# Patient Record
Sex: FEMALE | Race: WHITE | NOT HISPANIC OR LATINO | Employment: OTHER | URBAN - METROPOLITAN AREA
[De-identification: names, ages, dates, MRNs, and addresses within clinical notes are randomized per-mention and may not be internally consistent; named-entity substitution may affect disease eponyms.]

---

## 2017-01-26 ENCOUNTER — TRANSCRIBE ORDERS (OUTPATIENT)
Dept: ADMINISTRATIVE | Facility: HOSPITAL | Age: 82
End: 2017-01-26

## 2017-01-26 DIAGNOSIS — H93.91 UNSPECIFIED DISORDER OF RIGHT EAR: Primary | ICD-10-CM

## 2017-01-30 ENCOUNTER — HOSPITAL ENCOUNTER (OUTPATIENT)
Dept: CT IMAGING | Facility: HOSPITAL | Age: 82
Discharge: HOME | End: 2017-01-30
Attending: OTOLARYNGOLOGY | Admitting: OTOLARYNGOLOGY

## 2017-01-30 DIAGNOSIS — H93.91 UNSPECIFIED DISORDER OF RIGHT EAR: ICD-10-CM

## 2017-01-30 PROCEDURE — 70482 CT ORBIT/EAR/FOSSA W/O&W/DYE: CPT

## 2017-01-30 PROCEDURE — 0 IOPAMIDOL 61 % SOLUTION: Performed by: OTOLARYNGOLOGY

## 2017-01-30 PROCEDURE — 82565 ASSAY OF CREATININE: CPT

## 2017-01-30 RX ADMIN — IOPAMIDOL 60 ML: 612 INJECTION, SOLUTION INTRAVENOUS at 15:05

## 2017-02-02 LAB — CREAT BLDA-MCNC: 1 MG/DL (ref 0.6–1.3)

## 2017-04-19 ENCOUNTER — OFFICE VISIT (OUTPATIENT)
Dept: RADIATION ONCOLOGY | Facility: HOSPITAL | Age: 82
End: 2017-04-19

## 2017-04-19 ENCOUNTER — HOSPITAL ENCOUNTER (OUTPATIENT)
Dept: RADIATION ONCOLOGY | Facility: HOSPITAL | Age: 82
Setting detail: RADIATION/ONCOLOGY SERIES
Discharge: HOME | End: 2017-04-19

## 2017-04-19 VITALS
HEART RATE: 90 BPM | TEMPERATURE: 98.4 F | DIASTOLIC BLOOD PRESSURE: 80 MMHG | BODY MASS INDEX: 26.58 KG/M2 | OXYGEN SATURATION: 97 % | WEIGHT: 165.9 LBS | SYSTOLIC BLOOD PRESSURE: 158 MMHG

## 2017-04-19 DIAGNOSIS — C50.412 MALIGNANT NEOPLASM OF UPPER-OUTER QUADRANT OF LEFT FEMALE BREAST (HCC): Primary | ICD-10-CM

## 2017-04-19 PROCEDURE — G0463 HOSPITAL OUTPT CLINIC VISIT: HCPCS

## 2017-04-19 RX ORDER — DOCUSATE SODIUM 250 MG
250 CAPSULE ORAL DAILY PRN
COMMUNITY

## 2017-04-19 RX ORDER — NAPROXEN SODIUM 220 MG
220 TABLET ORAL 2 TIMES DAILY PRN
COMMUNITY

## 2017-04-19 RX ORDER — CHOLECALCIFEROL (VITAMIN D3) 25 MCG
1000 CAPSULE ORAL DAILY
COMMUNITY

## 2017-04-19 NOTE — PROGRESS NOTES
FOLLOW UP NOTE    PATIENT:                                                      Alessio Kwon  MEDICAL RECORD #:                        [id number]  :                                                          1932  COMPLETION DATE:    16  DIAGNOSIS:     Malignant neoplasm of upper-outer quadrant of left female breast    Staging form: Breast, AJCC V7      Clinical stage from 2016: Stage IIA (T1c, N1, M0)     BRIEF HISTORY:    Routine follow-up visit.  She has a history of left breast cancer, estrogen positive, metastatic to a single lymph node.  She completed adjuvant radiotherapy.  She continues a five-year course of hormonal therapy with Arimidex.  She appears to be tolerating that quite well.  Her chronic arthritis has slightly improved but remains her most significant problem.  She has not yet had a post treatment mammogram.    MEDICATIONS: Medication reconciliation for the patient was reviewed and confirmed in the electronic medical record.    Review of Systems   Constitutional: Negative.    HENT:   Positive for tinnitus.    Eyes: Negative.    Respiratory: Negative.    Cardiovascular: Negative.    Gastrointestinal: Positive for constipation.   Endocrine: Negative.    Genitourinary: Positive for bladder incontinence and frequency.    Musculoskeletal: Positive for arthralgias.   Skin: Negative.    Neurological: Negative.    Hematological: Bruises/bleeds easily.   Psychiatric/Behavioral: Negative.                   KPS 80%    Physical Exam   Constitutional: She is oriented to person, place, and time. She appears well-developed and well-nourished.   HENT:   Head: Normocephalic and atraumatic.   Neck: Normal range of motion. Neck supple.   Cardiovascular: Normal rate, regular rhythm and normal heart sounds.    No murmur heard.  Pulmonary/Chest: Effort normal and breath sounds normal. She has no wheezes. She has no rales. Left breast exhibits mass (5 cm area of induration corresponding to the  lumpectomy bed.  The remaining tissue is soft and there are no suspicious masses.) and skin change (no significant dermatitis.  Healed lumpectomy scar is in the lateral left breast.). Left breast exhibits no nipple discharge and no tenderness.   Abdominal: Soft. Bowel sounds are normal. She exhibits no distension. There is no hepatosplenomegaly. There is no tenderness.   Musculoskeletal: Normal range of motion. She exhibits no edema or tenderness.   Lymphadenopathy:     She has no cervical adenopathy.     She has no axillary adenopathy.        Right: No supraclavicular adenopathy present.        Left: No supraclavicular adenopathy present.   Neurological: She is alert and oriented to person, place, and time. She has normal strength. No sensory deficit.   Skin: Skin is warm and dry.   Psychiatric: She has a normal mood and affect. Her behavior is normal. Judgment and thought content normal.   Nursing note and vitals reviewed.      VITAL SIGNS:   Vitals:    04/19/17 1128   BP: 158/80   Pulse: 90   Temp: 98.4 °F (36.9 °C)   TempSrc: Temporal Artery    SpO2: 97%   Weight: 165 lb 14.4 oz (75.3 kg)   PainSc: 0-No pain       The following portions of the patient's history were reviewed and updated as appropriate: allergies, current medications, past family history, past medical history, past social history, past surgical history and problem list.     IMPRESSION:  6 months status post adjuvant radiotherapy for left breast cancer following conservation surgery.  No evidence of disease recurrence.    RECOMMENDATIONS:  She is due for initial post surgery mammography by mid June 2017.  She wishes scheduled this through her primary physician.    Continue Arimidex ×5 years.  This was been prescribed by Dr. Lebron.    Patient prefers follow-up as needed since she is doing so well.  I'll be happy to see her any time questions arise or if she develops symptoms.    Return if symptoms worsen or fail to improve.    Augustine Spring,  MD    Errors in dictation may reflect use of voice recognition software and not all errors in transcription may have been detected prior to signing.

## 2017-04-26 ENCOUNTER — TRANSCRIBE ORDERS (OUTPATIENT)
Dept: ADMINISTRATIVE | Facility: HOSPITAL | Age: 82
End: 2017-04-26

## 2017-04-26 DIAGNOSIS — R92.8 ABNORMAL MAMMOGRAM: Primary | ICD-10-CM

## 2017-05-05 ENCOUNTER — HOSPITAL ENCOUNTER (OUTPATIENT)
Dept: MAMMOGRAPHY | Facility: HOSPITAL | Age: 82
Discharge: HOME | End: 2017-05-05
Attending: FAMILY MEDICINE | Admitting: FAMILY MEDICINE

## 2017-05-05 DIAGNOSIS — R92.8 ABNORMAL MAMMOGRAM: ICD-10-CM

## 2017-05-05 PROCEDURE — G0279 TOMOSYNTHESIS, MAMMO: HCPCS | Performed by: RADIOLOGY

## 2017-05-05 PROCEDURE — G0204 DX MAMMO INCL CAD BI: HCPCS | Performed by: RADIOLOGY

## 2017-05-05 PROCEDURE — G0206 DX MAMMO INCL CAD UNI: HCPCS

## 2017-05-05 PROCEDURE — G0279 TOMOSYNTHESIS, MAMMO: HCPCS

## 2017-05-05 PROCEDURE — G0204 DX MAMMO INCL CAD BI: HCPCS

## 2017-09-22 ENCOUNTER — TRANSCRIBE ORDERS (OUTPATIENT)
Dept: ADMINISTRATIVE | Facility: HOSPITAL | Age: 82
End: 2017-09-22

## 2017-09-22 DIAGNOSIS — R92.8 ABNORMAL MAMMOGRAM: Primary | ICD-10-CM

## 2017-09-25 ENCOUNTER — TRANSCRIBE ORDERS (OUTPATIENT)
Dept: ADMINISTRATIVE | Facility: HOSPITAL | Age: 82
End: 2017-09-25

## 2017-09-25 ENCOUNTER — HOSPITAL ENCOUNTER (OUTPATIENT)
Dept: GENERAL RADIOLOGY | Facility: HOSPITAL | Age: 82
Discharge: HOME | End: 2017-09-25
Attending: FAMILY MEDICINE | Admitting: FAMILY MEDICINE

## 2017-09-25 DIAGNOSIS — R07.81 CHEST PAIN, PLEURITIC: Primary | ICD-10-CM

## 2017-09-25 DIAGNOSIS — R07.81 CHEST PAIN, PLEURITIC: ICD-10-CM

## 2017-09-25 PROCEDURE — 71020 HC CHEST PA AND LATERAL: CPT

## 2017-09-26 ENCOUNTER — TRANSCRIBE ORDERS (OUTPATIENT)
Dept: ADMINISTRATIVE | Facility: HOSPITAL | Age: 82
End: 2017-09-26

## 2017-09-26 DIAGNOSIS — J90 PLEURAL EFFUSION: Primary | ICD-10-CM

## 2017-09-28 ENCOUNTER — HOSPITAL ENCOUNTER (OUTPATIENT)
Dept: CT IMAGING | Facility: HOSPITAL | Age: 82
Discharge: HOME | End: 2017-09-28
Attending: FAMILY MEDICINE | Admitting: FAMILY MEDICINE

## 2017-09-28 DIAGNOSIS — J90 PLEURAL EFFUSION: ICD-10-CM

## 2017-09-28 PROCEDURE — 71250 CT THORAX DX C-: CPT

## 2017-11-07 ENCOUNTER — TRANSCRIBE ORDERS (OUTPATIENT)
Dept: MAMMOGRAPHY | Facility: HOSPITAL | Age: 82
End: 2017-11-07

## 2017-11-07 ENCOUNTER — HOSPITAL ENCOUNTER (OUTPATIENT)
Dept: MAMMOGRAPHY | Facility: HOSPITAL | Age: 82
Discharge: HOME | End: 2017-11-07
Attending: FAMILY MEDICINE | Admitting: FAMILY MEDICINE

## 2017-11-07 DIAGNOSIS — R92.8 ABNORMAL MAMMOGRAM: Primary | ICD-10-CM

## 2017-11-07 DIAGNOSIS — R92.8 ABNORMAL MAMMOGRAM: ICD-10-CM

## 2017-11-07 PROCEDURE — G0279 TOMOSYNTHESIS, MAMMO: HCPCS

## 2017-11-07 PROCEDURE — G0279 TOMOSYNTHESIS, MAMMO: HCPCS | Performed by: RADIOLOGY

## 2017-11-07 PROCEDURE — G0204 DX MAMMO INCL CAD BI: HCPCS

## 2017-11-07 PROCEDURE — G0204 DX MAMMO INCL CAD BI: HCPCS | Performed by: RADIOLOGY

## 2018-05-08 ENCOUNTER — HOSPITAL ENCOUNTER (OUTPATIENT)
Dept: MAMMOGRAPHY | Facility: HOSPITAL | Age: 83
Discharge: HOME | End: 2018-05-08
Attending: FAMILY MEDICINE | Admitting: FAMILY MEDICINE

## 2018-05-08 DIAGNOSIS — R92.8 ABNORMAL MAMMOGRAM: ICD-10-CM

## 2018-05-08 PROCEDURE — 77065 DX MAMMO INCL CAD UNI: CPT

## 2018-05-08 PROCEDURE — 77065 DX MAMMO INCL CAD UNI: CPT | Performed by: RADIOLOGY

## 2018-05-09 ENCOUNTER — TRANSCRIBE ORDERS (OUTPATIENT)
Dept: ADMINISTRATIVE | Facility: HOSPITAL | Age: 83
End: 2018-05-09

## 2018-05-09 DIAGNOSIS — R92.8 ABNORMAL MAMMOGRAM: Primary | ICD-10-CM

## 2018-11-09 ENCOUNTER — HOSPITAL ENCOUNTER (OUTPATIENT)
Dept: MAMMOGRAPHY | Facility: HOSPITAL | Age: 83
Discharge: HOME | End: 2018-11-09
Attending: FAMILY MEDICINE | Admitting: FAMILY MEDICINE

## 2018-11-09 DIAGNOSIS — R92.8 ABNORMAL MAMMOGRAM: ICD-10-CM

## 2018-11-09 PROCEDURE — 77066 DX MAMMO INCL CAD BI: CPT

## 2018-11-09 PROCEDURE — 77066 DX MAMMO INCL CAD BI: CPT | Performed by: RADIOLOGY

## 2018-11-09 PROCEDURE — G0279 TOMOSYNTHESIS, MAMMO: HCPCS

## 2018-11-09 PROCEDURE — G0279 TOMOSYNTHESIS, MAMMO: HCPCS | Performed by: RADIOLOGY

## 2022-01-16 ENCOUNTER — HOSPITAL ENCOUNTER (INPATIENT)
Facility: HOSPITAL | Age: 87
LOS: 2 days | Discharge: HOME | End: 2022-01-18
Attending: EMERGENCY MEDICINE | Admitting: INTERNAL MEDICINE

## 2022-01-16 ENCOUNTER — APPOINTMENT (OUTPATIENT)
Dept: GENERAL RADIOLOGY | Facility: HOSPITAL | Age: 87
End: 2022-01-16

## 2022-01-16 ENCOUNTER — APPOINTMENT (OUTPATIENT)
Dept: CT IMAGING | Facility: HOSPITAL | Age: 87
End: 2022-01-16

## 2022-01-16 DIAGNOSIS — E87.1 HYPONATREMIA: ICD-10-CM

## 2022-01-16 DIAGNOSIS — R79.89 POSITIVE D DIMER: ICD-10-CM

## 2022-01-16 DIAGNOSIS — R09.02 HYPOXIA: ICD-10-CM

## 2022-01-16 DIAGNOSIS — U07.1 COVID-19: Primary | ICD-10-CM

## 2022-01-16 LAB
ALBUMIN SERPL-MCNC: 4.3 G/DL (ref 3.5–5.2)
ALBUMIN SERPL-MCNC: 4.3 G/DL (ref 3.5–5.2)
ALBUMIN/GLOB SERPL: 2 G/DL
ALP SERPL-CCNC: 69 U/L (ref 39–117)
ALP SERPL-CCNC: 69 U/L (ref 39–117)
ALT SERPL W P-5'-P-CCNC: 12 U/L (ref 1–33)
ALT SERPL W P-5'-P-CCNC: 12 U/L (ref 1–33)
ANION GAP SERPL CALCULATED.3IONS-SCNC: 14 MMOL/L (ref 5–15)
AST SERPL-CCNC: 26 U/L (ref 1–32)
AST SERPL-CCNC: 27 U/L (ref 1–32)
B PARAPERT DNA SPEC QL NAA+PROBE: NOT DETECTED
B PERT DNA SPEC QL NAA+PROBE: NOT DETECTED
BASOPHILS # BLD AUTO: 0.02 10*3/MM3 (ref 0–0.2)
BASOPHILS NFR BLD AUTO: 0.4 % (ref 0–1.5)
BILIRUB CONJ SERPL-MCNC: <0.2 MG/DL (ref 0–0.3)
BILIRUB INDIRECT SERPL-MCNC: NORMAL MG/DL
BILIRUB SERPL-MCNC: 0.3 MG/DL (ref 0–1.2)
BILIRUB SERPL-MCNC: 0.4 MG/DL (ref 0–1.2)
BUN SERPL-MCNC: 22 MG/DL (ref 8–23)
BUN/CREAT SERPL: 21.2 (ref 7–25)
C PNEUM DNA NPH QL NAA+NON-PROBE: NOT DETECTED
CALCIUM SPEC-SCNC: 9.9 MG/DL (ref 8.6–10.5)
CHLORIDE SERPL-SCNC: 97 MMOL/L (ref 98–107)
CO2 SERPL-SCNC: 23 MMOL/L (ref 22–29)
CREAT SERPL-MCNC: 1.04 MG/DL (ref 0.57–1)
CREAT SERPL-MCNC: 1.04 MG/DL (ref 0.57–1)
CRP SERPL-MCNC: 0.34 MG/DL (ref 0–0.5)
D DIMER PPP FEU-MCNC: 1.94 MCGFEU/ML (ref 0–0.56)
D-LACTATE SERPL-SCNC: 0.9 MMOL/L (ref 0.5–2)
DEPRECATED RDW RBC AUTO: 43.4 FL (ref 37–54)
EOSINOPHIL # BLD AUTO: 0.02 10*3/MM3 (ref 0–0.4)
EOSINOPHIL NFR BLD AUTO: 0.4 % (ref 0.3–6.2)
ERYTHROCYTE [DISTWIDTH] IN BLOOD BY AUTOMATED COUNT: 13.5 % (ref 12.3–15.4)
FERRITIN SERPL-MCNC: 79.44 NG/ML (ref 13–150)
FLUAV SUBTYP SPEC NAA+PROBE: NOT DETECTED
FLUBV RNA ISLT QL NAA+PROBE: NOT DETECTED
GFR SERPL CREATININE-BSD FRML MDRD: 50 ML/MIN/1.73
GFR SERPL CREATININE-BSD FRML MDRD: 50 ML/MIN/1.73
GLOBULIN UR ELPH-MCNC: 2.1 GM/DL
GLUCOSE SERPL-MCNC: 90 MG/DL (ref 65–99)
HADV DNA SPEC NAA+PROBE: NOT DETECTED
HCOV 229E RNA SPEC QL NAA+PROBE: NOT DETECTED
HCOV HKU1 RNA SPEC QL NAA+PROBE: NOT DETECTED
HCOV NL63 RNA SPEC QL NAA+PROBE: NOT DETECTED
HCOV OC43 RNA SPEC QL NAA+PROBE: NOT DETECTED
HCT VFR BLD AUTO: 36.3 % (ref 34–46.6)
HGB BLD-MCNC: 12.3 G/DL (ref 12–15.9)
HMPV RNA NPH QL NAA+NON-PROBE: NOT DETECTED
HOLD SPECIMEN: NORMAL
HPIV1 RNA ISLT QL NAA+PROBE: NOT DETECTED
HPIV2 RNA SPEC QL NAA+PROBE: NOT DETECTED
HPIV3 RNA NPH QL NAA+PROBE: NOT DETECTED
HPIV4 P GENE NPH QL NAA+PROBE: NOT DETECTED
IMM GRANULOCYTES # BLD AUTO: 0.03 10*3/MM3 (ref 0–0.05)
IMM GRANULOCYTES NFR BLD AUTO: 0.5 % (ref 0–0.5)
LDH SERPL-CCNC: 222 U/L (ref 135–214)
LYMPHOCYTES # BLD AUTO: 0.73 10*3/MM3 (ref 0.7–3.1)
LYMPHOCYTES NFR BLD AUTO: 12.9 % (ref 19.6–45.3)
M PNEUMO IGG SER IA-ACNC: NOT DETECTED
MCH RBC QN AUTO: 29.7 PG (ref 26.6–33)
MCHC RBC AUTO-ENTMCNC: 33.9 G/DL (ref 31.5–35.7)
MCV RBC AUTO: 87.7 FL (ref 79–97)
MONOCYTES # BLD AUTO: 0.64 10*3/MM3 (ref 0.1–0.9)
MONOCYTES NFR BLD AUTO: 11.3 % (ref 5–12)
NEUTROPHILS NFR BLD AUTO: 4.22 10*3/MM3 (ref 1.7–7)
NEUTROPHILS NFR BLD AUTO: 74.5 % (ref 42.7–76)
NRBC BLD AUTO-RTO: 0 /100 WBC (ref 0–0.2)
NT-PROBNP SERPL-MCNC: 443.5 PG/ML (ref 0–1800)
PLATELET # BLD AUTO: 160 10*3/MM3 (ref 140–450)
PMV BLD AUTO: 9.2 FL (ref 6–12)
POTASSIUM SERPL-SCNC: 4.2 MMOL/L (ref 3.5–5.2)
PROCALCITONIN SERPL-MCNC: 0.06 NG/ML (ref 0–0.25)
PROT SERPL-MCNC: 6.4 G/DL (ref 6–8.5)
PROT SERPL-MCNC: 6.4 G/DL (ref 6–8.5)
QT INTERVAL: 360 MS
QTC INTERVAL: 435 MS
RBC # BLD AUTO: 4.14 10*6/MM3 (ref 3.77–5.28)
RHINOVIRUS RNA SPEC NAA+PROBE: NOT DETECTED
RSV RNA NPH QL NAA+NON-PROBE: NOT DETECTED
SARS-COV-2 RNA NPH QL NAA+NON-PROBE: DETECTED
SODIUM SERPL-SCNC: 134 MMOL/L (ref 136–145)
TROPONIN T SERPL-MCNC: <0.01 NG/ML (ref 0–0.03)
WBC NRBC COR # BLD: 5.66 10*3/MM3 (ref 3.4–10.8)
WHOLE BLOOD HOLD SPECIMEN: NORMAL
WHOLE BLOOD HOLD SPECIMEN: NORMAL

## 2022-01-16 PROCEDURE — 36415 COLL VENOUS BLD VENIPUNCTURE: CPT

## 2022-01-16 PROCEDURE — 93005 ELECTROCARDIOGRAM TRACING: CPT | Performed by: EMERGENCY MEDICINE

## 2022-01-16 PROCEDURE — 71045 X-RAY EXAM CHEST 1 VIEW: CPT

## 2022-01-16 PROCEDURE — 71275 CT ANGIOGRAPHY CHEST: CPT

## 2022-01-16 PROCEDURE — 99222 1ST HOSP IP/OBS MODERATE 55: CPT | Performed by: STUDENT IN AN ORGANIZED HEALTH CARE EDUCATION/TRAINING PROGRAM

## 2022-01-16 PROCEDURE — 80053 COMPREHEN METABOLIC PANEL: CPT | Performed by: EMERGENCY MEDICINE

## 2022-01-16 PROCEDURE — 87040 BLOOD CULTURE FOR BACTERIA: CPT | Performed by: EMERGENCY MEDICINE

## 2022-01-16 PROCEDURE — 25010000005 REMDESIVIR 100 MG/20ML SOLUTION 1 EACH VIAL: Performed by: EMERGENCY MEDICINE

## 2022-01-16 PROCEDURE — 85379 FIBRIN DEGRADATION QUANT: CPT | Performed by: EMERGENCY MEDICINE

## 2022-01-16 PROCEDURE — 25010000002 DEXAMETHASONE PER 1 MG: Performed by: EMERGENCY MEDICINE

## 2022-01-16 PROCEDURE — 84484 ASSAY OF TROPONIN QUANT: CPT | Performed by: EMERGENCY MEDICINE

## 2022-01-16 PROCEDURE — 84145 PROCALCITONIN (PCT): CPT | Performed by: EMERGENCY MEDICINE

## 2022-01-16 PROCEDURE — XW033E5 INTRODUCTION OF REMDESIVIR ANTI-INFECTIVE INTO PERIPHERAL VEIN, PERCUTANEOUS APPROACH, NEW TECHNOLOGY GROUP 5: ICD-10-PCS | Performed by: INTERNAL MEDICINE

## 2022-01-16 PROCEDURE — 83605 ASSAY OF LACTIC ACID: CPT | Performed by: EMERGENCY MEDICINE

## 2022-01-16 PROCEDURE — 82248 BILIRUBIN DIRECT: CPT | Performed by: EMERGENCY MEDICINE

## 2022-01-16 PROCEDURE — 82565 ASSAY OF CREATININE: CPT | Performed by: EMERGENCY MEDICINE

## 2022-01-16 PROCEDURE — 25010000002 ENOXAPARIN PER 10 MG: Performed by: STUDENT IN AN ORGANIZED HEALTH CARE EDUCATION/TRAINING PROGRAM

## 2022-01-16 PROCEDURE — 99284 EMERGENCY DEPT VISIT MOD MDM: CPT

## 2022-01-16 PROCEDURE — 0202U NFCT DS 22 TRGT SARS-COV-2: CPT | Performed by: EMERGENCY MEDICINE

## 2022-01-16 PROCEDURE — 85025 COMPLETE CBC W/AUTO DIFF WBC: CPT | Performed by: EMERGENCY MEDICINE

## 2022-01-16 PROCEDURE — 83615 LACTATE (LD) (LDH) ENZYME: CPT | Performed by: EMERGENCY MEDICINE

## 2022-01-16 PROCEDURE — 86140 C-REACTIVE PROTEIN: CPT | Performed by: EMERGENCY MEDICINE

## 2022-01-16 PROCEDURE — 83880 ASSAY OF NATRIURETIC PEPTIDE: CPT | Performed by: EMERGENCY MEDICINE

## 2022-01-16 PROCEDURE — 82728 ASSAY OF FERRITIN: CPT | Performed by: EMERGENCY MEDICINE

## 2022-01-16 PROCEDURE — 0 IOPAMIDOL PER 1 ML: Performed by: EMERGENCY MEDICINE

## 2022-01-16 RX ORDER — FOLIC ACID 1 MG/1
1 TABLET ORAL DAILY
Status: DISCONTINUED | OUTPATIENT
Start: 2022-01-16 | End: 2022-01-18 | Stop reason: HOSPADM

## 2022-01-16 RX ORDER — AMLODIPINE BESYLATE 5 MG/1
TABLET ORAL
COMMUNITY

## 2022-01-16 RX ORDER — ACETAMINOPHEN 500 MG
1000 TABLET ORAL ONCE
Status: COMPLETED | OUTPATIENT
Start: 2022-01-16 | End: 2022-01-16

## 2022-01-16 RX ORDER — DEXAMETHASONE SODIUM PHOSPHATE 4 MG/ML
6 INJECTION, SOLUTION INTRA-ARTICULAR; INTRALESIONAL; INTRAMUSCULAR; INTRAVENOUS; SOFT TISSUE ONCE
Status: COMPLETED | OUTPATIENT
Start: 2022-01-16 | End: 2022-01-16

## 2022-01-16 RX ORDER — UPADACITINIB 15 MG/1
TABLET, EXTENDED RELEASE ORAL
Status: ON HOLD | COMMUNITY
End: 2022-01-18 | Stop reason: SDUPTHER

## 2022-01-16 RX ORDER — LISINOPRIL 20 MG/1
20 TABLET ORAL DAILY
Status: DISCONTINUED | OUTPATIENT
Start: 2022-01-16 | End: 2022-01-18 | Stop reason: HOSPADM

## 2022-01-16 RX ORDER — DOCUSATE SODIUM 100 MG/1
200 CAPSULE, LIQUID FILLED ORAL DAILY PRN
Status: DISCONTINUED | OUTPATIENT
Start: 2022-01-16 | End: 2022-01-18 | Stop reason: HOSPADM

## 2022-01-16 RX ORDER — BENZONATATE 100 MG/1
100 CAPSULE ORAL 3 TIMES DAILY PRN
Status: DISCONTINUED | OUTPATIENT
Start: 2022-01-16 | End: 2022-01-18 | Stop reason: HOSPADM

## 2022-01-16 RX ORDER — MULTIPLE VITAMINS W/ MINERALS TAB 9MG-400MCG
1 TAB ORAL DAILY
Status: DISCONTINUED | OUTPATIENT
Start: 2022-01-16 | End: 2022-01-18 | Stop reason: HOSPADM

## 2022-01-16 RX ORDER — DEXAMETHASONE SODIUM PHOSPHATE 4 MG/ML
6 INJECTION, SOLUTION INTRA-ARTICULAR; INTRALESIONAL; INTRAMUSCULAR; INTRAVENOUS; SOFT TISSUE DAILY
Status: DISCONTINUED | OUTPATIENT
Start: 2022-01-17 | End: 2022-01-18 | Stop reason: HOSPADM

## 2022-01-16 RX ORDER — AMLODIPINE BESYLATE 5 MG/1
5 TABLET ORAL
Status: DISCONTINUED | OUTPATIENT
Start: 2022-01-16 | End: 2022-01-18 | Stop reason: HOSPADM

## 2022-01-16 RX ORDER — SODIUM CHLORIDE 9 MG/ML
125 INJECTION, SOLUTION INTRAVENOUS CONTINUOUS
Status: ACTIVE | OUTPATIENT
Start: 2022-01-16 | End: 2022-01-16

## 2022-01-16 RX ORDER — SODIUM CHLORIDE 0.9 % (FLUSH) 0.9 %
10 SYRINGE (ML) INJECTION AS NEEDED
Status: DISCONTINUED | OUTPATIENT
Start: 2022-01-16 | End: 2022-01-18 | Stop reason: HOSPADM

## 2022-01-16 RX ORDER — MULTIPLE VITAMINS W/ MINERALS TAB 9MG-400MCG
TAB ORAL
COMMUNITY

## 2022-01-16 RX ADMIN — DEXAMETHASONE SODIUM PHOSPHATE 6 MG: 4 INJECTION, SOLUTION INTRA-ARTICULAR; INTRALESIONAL; INTRAMUSCULAR; INTRAVENOUS; SOFT TISSUE at 09:22

## 2022-01-16 RX ADMIN — IOPAMIDOL 85 ML: 755 INJECTION, SOLUTION INTRAVENOUS at 10:26

## 2022-01-16 RX ADMIN — ENOXAPARIN SODIUM 40 MG: 40 INJECTION SUBCUTANEOUS at 14:30

## 2022-01-16 RX ADMIN — REMDESIVIR 200 MG: 100 INJECTION, POWDER, LYOPHILIZED, FOR SOLUTION INTRAVENOUS at 10:56

## 2022-01-16 RX ADMIN — ACETAMINOPHEN 1000 MG: 500 TABLET, FILM COATED ORAL at 10:55

## 2022-01-16 RX ADMIN — SODIUM CHLORIDE 125 ML/HR: 9 INJECTION, SOLUTION INTRAVENOUS at 10:51

## 2022-01-16 RX ADMIN — SODIUM CHLORIDE 125 ML/HR: 9 INJECTION, SOLUTION INTRAVENOUS at 16:30

## 2022-01-16 RX ADMIN — FOLIC ACID 1 MG: 1 TABLET ORAL at 14:30

## 2022-01-16 RX ADMIN — SODIUM CHLORIDE 500 ML: 9 INJECTION, SOLUTION INTRAVENOUS at 10:51

## 2022-01-16 RX ADMIN — Medication 1 TABLET: at 14:29

## 2022-01-16 NOTE — H&P
[facility]  HISTORY AND PHYSICAL    Patient Name: Alessio Kwon  : 1932  MRN: [id number]  Primary Care Physician: Alice Kong MD  Date of admission: 2022      Subjective   Subjective     Chief Complaint:  Dyspnea    HPI:  Alessio Kwon is a 89 y.o. female with a history of rheumatoid arthritis, prior breast cancer, hypertension who is presenting with a fever, and dyspnea and some body aches since yesterday afternoon.  She is fully vaccinated and boosted.  Denies any other complaints.  Discussed with daughter Taiwo Alexia and she may have had an exposure from a family member but everybody is vaccinated.    COVID Details:    Symptoms:    [] NONE [x] Fever [x]  Cough [x] Shortness of breath [] Change in taste/smell      Review of Systems   Gen-fever yesterday and today, some chills  CV- No chest pain, palpitations  Resp-Dors is cough and dyspnea  GI- No N/V/D, abd pain      All other systems reviewed and are negative.     Personal History     Past Medical History:   Diagnosis Date   • Arthritis    • Breast cancer (HCC) 2016    left   • Cancer (HCC)     skin cancer   • Hx of radiation therapy 2016    left breast   • Hypertension    • RA (rheumatoid arthritis) (HCC)        Past Surgical History:   Procedure Laterality Date   • BREAST BIOPSY Left 06/10/2016    US bx   • BREAST CYST EXCISION Right s   • BREAST LUMPECTOMY Left 2016       Family History:  family history is not on file. Otherwise pertinent FHx was reviewed and unremarkable.     Social History:  reports that she has never smoked. She has never used smokeless tobacco. She reports that she does not drink alcohol and does not use drugs.  Social History     Social History Narrative   • Not on file       Medications:  Available home medication information reviewed.  Medications Prior to Admission   Medication Sig Dispense Refill Last Dose   • Cholecalciferol (VITAMIN D-3) 1000 UNITS  capsule Take 1,000 Units by mouth Daily.      • docusate sodium (COLACE) 250 MG capsule Take 250 mg by mouth Daily As Needed for Constipation.      • folic acid (FOLVITE) 1 MG tablet Take 1 mg by mouth Daily.      • lisinopril (PRINIVIL,ZESTRIL) 20 MG tablet Take 20 mg by mouth daily.      • multivitamin with minerals (MULTIVITAMIN ADULT PO) multivitamin      • predniSONE (DELTASONE) 2.5 MG tablet Take 2.5 mg by mouth Daily.      • Thiamine HCl (VITAMIN B-1 PO) Take 1 tablet by mouth.      • Upadacitinib ER (Rinvoq) 15 MG tablet sustained-release 24 hour Rinvoq 15 mg tablet,extended release      • amLODIPine (NORVASC) 5 MG tablet amlodipine 5 mg tablet      • anastrozole (ARIMIDEX) 1 MG chemo tablet Take 1 mg by mouth daily.      • naproxen sodium (ALEVE) 220 MG tablet Take 220 mg by mouth 2 (Two) Times a Day As Needed for Mild Pain (1-3).          No Known Allergies    Objective   Objective     Vital Signs:   Temp:  [98.8 °F (37.1 °C)-100.4 °F (38 °C)] 98.8 °F (37.1 °C)  Heart Rate:  [49-89] 49  Resp:  [20] 20  BP: (107-170)/(52-87) 141/77  Flow (L/min):  [2] 2       Physical Exam   Constitutional: No acute distress, awake, alert, pleasant, resting comfortably  HENT: NCAT, mucous membranes moist  Respiratory: Clear to auscultation bilaterally, respiratory effort normal, on 2 L of nasal cannula  Cardiovascular: RRR, no murmurs, rubs, or gallops  Gastrointestinal: Positive bowel sounds, soft, nontender, nondistended  Musculoskeletal: Very trace bilateral lower extremity edema to the mid tibia  Psychiatric: Appropriate affect, cooperative  Neurologic: Oriented x 3, no focal neurodeficits  Skin: No rashes      Result Review:  I have personally reviewed the results from the time of this admission to 1/16/2022 13:22 EST and agree with these findings:  [x]  Laboratory  [x]  Microbiology  [x]  Radiology  [x]  EKG/Telemetry   [x]  Cardiology/Vascular   []  Pathology  []  Old records  []  Other:  Most notable findings  include:   Bradycardia of heart rate in the 40s to 50s  Creatinine is 1.04  D-dimer is positive CBC unremarkable  CTA does not show PTE and actually does not show significant infectious or inflammatory process, x-ray is consistent with this  COVID-positive    LAB RESULTS:      Lab 01/16/22  0900   WBC 5.66   HEMOGLOBIN 12.3   HEMATOCRIT 36.3   PLATELETS 160   NEUTROS ABS 4.22   IMMATURE GRANS (ABS) 0.03   LYMPHS ABS 0.73   MONOS ABS 0.64   EOS ABS 0.02   MCV 87.7   CRP 0.34   PROCALCITONIN 0.06   LACTATE 0.9   *   D DIMER QUANT 1.94*         Lab 01/16/22  0900   SODIUM 134*   POTASSIUM 4.2   CHLORIDE 97*   CO2 23.0   ANION GAP 14.0   BUN 22   CREATININE 1.04*   GLUCOSE 90   CALCIUM 9.9         Lab 01/16/22  0900   TOTAL PROTEIN 6.4  6.4   ALBUMIN 4.30  4.30   GLOBULIN 2.1   ALT (SGPT) 12  12   AST (SGOT) 27  26   BILIRUBIN 0.3  0.4   BILIRUBIN DIRECT <0.2   ALK PHOS 69  69         Lab 01/16/22  0928 01/16/22  0900   PROBNP  --  443.5   TROPONIN T <0.010  --              Lab 01/16/22  0900   FERRITIN 79.44             Microbiology Results (last 10 days)     Procedure Component Value - Date/Time    Respiratory Panel PCR w/COVID-19(SARS-CoV-2) [facility] In-House, NP Swab in UTM/VTM, 3-4 HR TAT - Swab, Nasopharynx [[id number]]  (Abnormal) Collected: 01/16/22 0855    Lab Status: Final result Specimen: Swab from Nasopharynx Updated: 01/16/22 1009     ADENOVIRUS, PCR Not Detected     Coronavirus 229E Not Detected     Coronavirus HKU1 Not Detected     Coronavirus NL63 Not Detected     Coronavirus OC43 Not Detected     COVID19 Detected     Human Metapneumovirus Not Detected     Human Rhinovirus/Enterovirus Not Detected     Influenza A PCR Not Detected     Influenza B PCR Not Detected     Parainfluenza Virus 1 Not Detected     Parainfluenza Virus 2 Not Detected     Parainfluenza Virus 3 Not Detected     Parainfluenza Virus 4 Not Detected     RSV, PCR Not Detected     Bordetella pertussis pcr Not  Detected     Bordetella parapertussis PCR Not Detected     Chlamydophila pneumoniae PCR Not Detected     Mycoplasma pneumo by PCR Not Detected    Narrative:      In the setting of a positive respiratory panel with a viral infection PLUS a negative procalcitonin without other underlying concern for bacterial infection, consider observing off antibiotics or discontinuation of antibiotics and continue supportive care. If the respiratory panel is positive for atypical bacterial infection (Bordetella pertussis, Chlamydophila pneumoniae, or Mycoplasma pneumoniae), consider antibiotic de-escalation to target atypical bacterial infection.          XR Chest 1 View    Result Date: 1/16/2022  EXAMINATION: XR CHEST 1 VW-  INDICATION: SOA triage protocol  COMPARISON: 9/25/2017  FINDINGS: Mild chronic changes of the lung fields are present without focal airspace opacity. There is no significant pleural effusion or distinct pneumothorax. Normal heart and mediastinal contours.      Impression: Mild chronic changes of the lung fields without evidence of acute cardiopulmonary abnormality.  This report was finalized on 1/16/2022 9:26 AM by Jay Bautista.      CT Angiogram Chest    Result Date: 1/16/2022  EXAMINATION: CT ANGIOGRAM CHEST-  INDICATION: covid, yung, + d dimer; U07.1-COVID-19; R09.02-Hypoxemia; R79.89-Other specified abnormal findings of blood chemistry; E87.9-[id number]-[id number] and hyponatremia  TECHNIQUE: Axial pulmonary arterial phase IV contrast enhanced CT angiogram of the chest per PE protocol. Two-dimensional reconstructions were postprocessed.  The radiation dose reduction device was turned on for each scan per the ALARA (As Low as Reasonably Achievable) protocol.  COMPARISON: 9/28/2017  FINDINGS: No pathologic axillary adenopathy or other worrisome body wall soft tissue finding in the chest. No acute findings in the partially imaged upper abdomen. No pleural or pericardial effusion. No pathologic mediastinal or  hilar lymphadenopathy. Evaluation of the osseous structures demonstrates no evidence of acute fracture or aggressive osseous lesion. Atherosclerotic nonaneurysmal thoracic aorta. Evaluation of the pulmonary arteries demonstrates no evidence of filling defect concerning for acute pulmonary embolus. Evaluation of the lung fields demonstrates some minimal peribronchial tree-in-bud nodularity in the lingula and right upper lobe, likely minimal chronic or atypical pneumonia. There is otherwise no evidence of acute infectious or inflammatory process.      Impression: No pulmonary embolus.  Evaluation of the lung fields demonstrates some minimal peribronchial tree-in-bud nodularity in the lingula and right upper lobe, likely minimal chronic or atypical pneumonia. There is otherwise no evidence of acute infectious or inflammatory process.   This report was finalized on 1/16/2022 11:05 AM by Jay Bautista.            Assessment/Plan   Assessment & Plan     Active Hospital Problems    Diagnosis  POA   • COVID-19 [U07.1]  Yes       Alessio Kwon is an 89-year-old female with a history of breast cancer, hypertension, rheumatoid arthritis who is admitted for acute hypoxemic respiratory failure secondary suspected to COVID-19 pneumonia.  She is vaccinated and boosted.  Symptoms first started on 1/15/2022    Acute hypoxemic respiratory failure  COVID-19 pneumonia  - Remdesivir was given in the ED however given bradycardia will discontinue  - Day 1/10 of Decadron  - Imaging does not show overwhelming inflammatory or infectious process.  No PTE seen on CTA    Elevation in creatinine  - Based on BUN/creatinine ratio she seems to be volume down and this creatinine elevation of prerenal etiology.  We will continue IV fluids for 12 hours with a repeat BMP in the a.m.    Hypertension  -home meds amlodipine and lisinopril continued    DVT prophylaxis: Lovenox      CODE STATUS: DNR/DNI  Code Status and Medical Interventions:    Ordered at: 01/16/22 1314     Level Of Support Discussed With:    Patient    Next of Kin (If No Surrogate)     Code Status (Patient has no pulse and is not breathing):    No CPR (Do Not Attempt to Resuscitate)     Medical Interventions (Patient has pulse or is breathing):    Full Support       Admission Status:  I believe this patient meets observation criteria    Pema Butcher MD  01/16/22

## 2022-01-16 NOTE — ED PROVIDER NOTES
Subjective   This is a pleasant 89-year-old female known to me personally.  She is the mother of Dr. Gerhardtstine.  At her baseline she lives with her family and ambulates with a cane and is not on home oxygen.  She has been fully vaccinated and boosted against COVID.  She still is active gets throughout the house without much difficulty.    She presents the emergency room today with increasing shortness of breath and testing positive for COVID at home.  She has had a cough and has not felt well for couple days.  When she exerts herself she baseline has a bit of difficulty in breathing but it is gotten worse.  She has had some fever but not much chills.  She has had a fair appetite and bowel movements and urine have been her baseline and she has not passed blood per any orifice.  She has had no recent change in her medications.    She does have rheumatoid arthritis is on chronic immunosuppression as well as chronic prednisone therapy and methotrexate.    No one at home is currently ill.        All other systems are reviewed and are negative except as noted above.          Review of Systems   All other systems reviewed and are negative.      Past Medical History:   Diagnosis Date   • Arthritis    • Breast cancer (HCC) 2016    left   • Cancer (HCC)     skin cancer   • Hx of radiation therapy 2016    left breast   • Hypertension    • RA (rheumatoid arthritis) (HCC)        No Known Allergies    Past Surgical History:   Procedure Laterality Date   • BREAST BIOPSY Left 06/10/2016    US bx   • BREAST CYST EXCISION Right 1950's   • BREAST LUMPECTOMY Left 06/22/2016       Family History   Problem Relation Age of Onset   • Breast cancer Neg Hx    • Ovarian cancer Neg Hx    • Endometrial cancer Neg Hx        Social History     Socioeconomic History   • Marital status:    Tobacco Use   • Smoking status: Never Smoker   • Smokeless tobacco: Never Used   Substance and Sexual Activity   • Alcohol use: No   • Drug use: No    • Sexual activity: Defer           Objective   Physical Exam  Vitals and nursing note reviewed. Exam conducted with a chaperone present.   Constitutional:       Appearance: She is normal weight.      Comments: Delightful 89-year-old no acute distress her oxygen saturations about 91% on room air is 94% on 2 L.  She is speaking in complete sentences.  Her skin is warm to the touch I believe she has a fever.  GCS is 15.   HENT:      Head: Normocephalic and atraumatic.      Right Ear: External ear normal.      Left Ear: External ear normal.      Nose: Nose normal.      Mouth/Throat:      Mouth: Mucous membranes are moist.      Pharynx: Oropharynx is clear.   Eyes:      Extraocular Movements: Extraocular movements intact.      Conjunctiva/sclera: Conjunctivae normal.      Pupils: Pupils are equal, round, and reactive to light.   Cardiovascular:      Rate and Rhythm: Normal rate and regular rhythm.      Pulses: Normal pulses.      Heart sounds: Normal heart sounds.   Pulmonary:      Effort: Pulmonary effort is normal.      Breath sounds: Normal breath sounds.   Abdominal:      Comments: BMI 24 soft and nontender without organomegaly, masses, or guarding.   Musculoskeletal:      Cervical back: Normal range of motion and neck supple.      Comments: She has mild edema and venous stasis changes at the ankles which is chronic for her.  She has fourfold particles in her feet no tissue loss or cellulitis I can see.   Skin:     General: Skin is warm and dry.      Capillary Refill: Capillary refill takes less than 2 seconds.   Neurological:      Mental Status: She is alert.      Comments: Face is symmetric voice is strong tongue is midline.  Vision, hearing, and speech are preserved.  He has mild generalized weakness without focality.         Procedures           ED Course                Recent Results (from the past 24 hour(s))   Respiratory Panel PCR w/COVID-19(SARS-CoV-2) [facility] In-House, NP Swab in  UTM/VTM, 3-4 HR TAT - Swab, Nasopharynx    Collection Time: 01/16/22  8:55 AM    Specimen: Nasopharynx; Swab   Result Value Ref Range    ADENOVIRUS, PCR Not Detected Not Detected    Coronavirus 229E Not Detected Not Detected    Coronavirus HKU1 Not Detected Not Detected    Coronavirus NL63 Not Detected Not Detected    Coronavirus OC43 Not Detected Not Detected    COVID19 Detected (C) Not Detected - Ref. Range    Human Metapneumovirus Not Detected Not Detected    Human Rhinovirus/Enterovirus Not Detected Not Detected    Influenza A PCR Not Detected Not Detected    Influenza B PCR Not Detected Not Detected    Parainfluenza Virus 1 Not Detected Not Detected    Parainfluenza Virus 2 Not Detected Not Detected    Parainfluenza Virus 3 Not Detected Not Detected    Parainfluenza Virus 4 Not Detected Not Detected    RSV, PCR Not Detected Not Detected    Bordetella pertussis pcr Not Detected Not Detected    Bordetella parapertussis PCR Not Detected Not Detected    Chlamydophila pneumoniae PCR Not Detected Not Detected    Mycoplasma pneumo by PCR Not Detected Not Detected   Comprehensive Metabolic Panel    Collection Time: 01/16/22  9:00 AM    Specimen: Blood   Result Value Ref Range    Glucose 90 65 - 99 mg/dL    BUN 22 8 - 23 mg/dL    Creatinine 1.04 (H) 0.57 - 1.00 mg/dL    Sodium 134 (L) 136 - 145 mmol/L    Potassium 4.2 3.5 - 5.2 mmol/L    Chloride 97 (L) 98 - 107 mmol/L    CO2 23.0 22.0 - 29.0 mmol/L    Calcium 9.9 8.6 - 10.5 mg/dL    Total Protein 6.4 6.0 - 8.5 g/dL    Albumin 4.30 3.50 - 5.20 g/dL    ALT (SGPT) 12 1 - 33 U/L    AST (SGOT) 26 1 - 32 U/L    Alkaline Phosphatase 69 39 - 117 U/L    Total Bilirubin 0.4 0.0 - 1.2 mg/dL    eGFR Non African Amer 50 (L) >60 mL/min/1.73    Globulin 2.1 gm/dL    A/G Ratio 2.0 g/dL    BUN/Creatinine Ratio 21.2 7.0 - 25.0    Anion Gap 14.0 5.0 - 15.0 mmol/L   BNP    Collection Time: 01/16/22  9:00 AM    Specimen: Blood   Result Value Ref Range    proBNP 443.5 0.0-1,800.0 pg/mL    Lavender Top    Collection Time: 01/16/22  9:00 AM   Result Value Ref Range    Extra Tube hold for add-on    Gold Top - SST    Collection Time: 01/16/22  9:00 AM   Result Value Ref Range    Extra Tube Hold for add-ons.    Light Blue Top    Collection Time: 01/16/22  9:00 AM   Result Value Ref Range    Extra Tube hold for add-on    CBC Auto Differential    Collection Time: 01/16/22  9:00 AM    Specimen: Blood   Result Value Ref Range    WBC 5.66 3.40 - 10.80 10*3/mm3    RBC 4.14 3.77 - 5.28 10*6/mm3    Hemoglobin 12.3 12.0 - 15.9 g/dL    Hematocrit 36.3 34.0 - 46.6 %    MCV 87.7 79.0 - 97.0 fL    MCH 29.7 26.6 - 33.0 pg    MCHC 33.9 31.5 - 35.7 g/dL    RDW 13.5 12.3 - 15.4 %    RDW-SD 43.4 37.0 - 54.0 fl    MPV 9.2 6.0 - 12.0 fL    Platelets 160 140 - 450 10*3/mm3    Neutrophil % 74.5 42.7 - 76.0 %    Lymphocyte % 12.9 (L) 19.6 - 45.3 %    Monocyte % 11.3 5.0 - 12.0 %    Eosinophil % 0.4 0.3 - 6.2 %    Basophil % 0.4 0.0 - 1.5 %    Immature Grans % 0.5 0.0 - 0.5 %    Neutrophils, Absolute 4.22 1.70 - 7.00 10*3/mm3    Lymphocytes, Absolute 0.73 0.70 - 3.10 10*3/mm3    Monocytes, Absolute 0.64 0.10 - 0.90 10*3/mm3    Eosinophils, Absolute 0.02 0.00 - 0.40 10*3/mm3    Basophils, Absolute 0.02 0.00 - 0.20 10*3/mm3    Immature Grans, Absolute 0.03 0.00 - 0.05 10*3/mm3    nRBC 0.0 0.0 - 0.2 /100 WBC   C-reactive Protein    Collection Time: 01/16/22  9:00 AM    Specimen: Blood   Result Value Ref Range    C-Reactive Protein 0.34 0.00 - 0.50 mg/dL   Lactic Acid, Plasma    Collection Time: 01/16/22  9:00 AM    Specimen: Blood   Result Value Ref Range    Lactate 0.9 0.5 - 2.0 mmol/L   D-dimer, Quantitative    Collection Time: 01/16/22  9:00 AM    Specimen: Blood   Result Value Ref Range    D-Dimer, Quantitative 1.94 (H) 0.00 - 0.56 MCGFEU/mL   Procalcitonin    Collection Time: 01/16/22  9:00 AM    Specimen: Blood   Result Value Ref Range    Procalcitonin 0.06 0.00 - 0.25 ng/mL   Lactate Dehydrogenase    Collection Time:  01/16/22  9:00 AM    Specimen: Blood   Result Value Ref Range     (H) 135 - 214 U/L   Ferritin    Collection Time: 01/16/22  9:00 AM    Specimen: Blood   Result Value Ref Range    Ferritin 79.44 13.00 - 150.00 ng/mL   Hepatic Function Panel    Collection Time: 01/16/22  9:00 AM    Specimen: Blood   Result Value Ref Range    Total Protein 6.4 6.0 - 8.5 g/dL    Albumin 4.30 3.50 - 5.20 g/dL    ALT (SGPT) 12 1 - 33 U/L    AST (SGOT) 27 1 - 32 U/L    Alkaline Phosphatase 69 39 - 117 U/L    Total Bilirubin 0.3 0.0 - 1.2 mg/dL    Bilirubin, Direct <0.2 0.0 - 0.3 mg/dL    Bilirubin, Indirect     Troponin    Collection Time: 01/16/22  9:28 AM    Specimen: Blood   Result Value Ref Range    Troponin T <0.010 0.000 - 0.030 ng/mL   Green Top (Gel)    Collection Time: 01/16/22  9:28 AM   Result Value Ref Range    Extra Tube Hold for add-ons.      Note: In addition to lab results from this visit, the labs listed above may include labs taken at another facility or during a different encounter within the last 24 hours. Please correlate lab times with ED admission and discharge times for further clarification of the services performed during this visit.    CT Angiogram Chest   Final Result   No pulmonary embolus.       Evaluation of the lung fields demonstrates some minimal peribronchial   tree-in-bud nodularity in the lingula and right upper lobe, likely   minimal chronic or atypical pneumonia. There is otherwise no evidence of   acute infectious or inflammatory process.           This report was finalized on 1/16/2022 11:05 AM by Jay Bautista.          XR Chest 1 View   Final Result   Mild chronic changes of the lung fields without evidence of   acute cardiopulmonary abnormality.        This report was finalized on 1/16/2022 9:26 AM by Jay Bautista.            Vitals:    01/16/22 0832 01/16/22 1055 01/16/22 1100 01/16/22 1140   BP: 170/87 122/60 107/52 141/77   BP Location: Left arm      Patient Position: Sitting     "  Pulse: 89 64 61    Resp: 20      Temp: 100.4 °F (38 °C) 98.8 °F (37.1 °C)     TempSrc: Oral Oral     SpO2: 94% 97% 97% 97%   Weight: 63.5 kg (140 lb)   68.9 kg (152 lb)   Height: 167.6 cm (66\")   167.6 cm (66\")     Medications   sodium chloride 0.9 % flush 10 mL (has no administration in time range)   sodium chloride 0.9 % infusion (125 mL/hr Intravenous New Bag 1/16/22 1051)   Pharmacy Consult - Remdesivir (has no administration in time range)   remdesivir 200 mg in sodium chloride 0.9 % 290 mL IVPB (powder vial) (200 mg Intravenous New Bag 1/16/22 1056)     Followed by   remdesivir 100 mg in sodium chloride 0.9 % 250 mL IVPB (powder vial) (has no administration in time range)   dexamethasone (DECADRON) injection 6 mg (6 mg Intravenous Given 1/16/22 0922)   sodium chloride 0.9 % bolus 500 mL (500 mL Intravenous New Bag 1/16/22 1051)   acetaminophen (TYLENOL) tablet 1,000 mg (1,000 mg Oral Given 1/16/22 1055)   iopamidol (ISOVUE-370) 76 % injection 85 mL (85 mL Intravenous Given 1/16/22 1026)     ECG/EMG Results (last 24 hours)     Procedure Component Value Units Date/Time    ECG 12 Lead [[id number]] Collected: 01/16/22 0859     Updated: 01/16/22 0857        ECG 12 Lead                                            MDM  Number of Diagnoses or Management Options  COVID-19  Hyponatremia  Hypoxia  Positive D dimer  Diagnosis management comments:       I have reviewed all available studies at the bedside with the patient.  She is COVID positive.  She is hypoxic and is not normally on oxygen.  I started IV Decadron on the patient and I have ordered IV remdesivir as well.  CT scan of the chest done for a positive D-dimer and difficulty in breathing is pending at the time of admission and to be followed by the hospitalist service.  With the patient's permission I spoke with her daughter regarding our findings.    She is also very mildly hyponatremic is receiving IV fluid and antipyretics.  Her procalcitonin is normal I have " ordered blood cultures but I have not treated the patient with antibiotics as of yet.  I will defer that to the admitting service.    I spoke Dr. Bueno, on-call hospital medicine of her and her service will admit the patient.    Turcios agreeable with the plan       Amount and/or Complexity of Data Reviewed  Clinical lab tests: reviewed  Tests in the radiology section of CPT®: reviewed  Tests in the medicine section of CPT®: reviewed        Final diagnoses:   COVID-19   Hypoxia   Positive D dimer   Hyponatremia       ED Disposition  ED Disposition     ED Disposition Condition Comment    Decision to Admit  Level of Care: Telemetry [5]   Diagnosis: COVID-19 [[id number]]   Admitting Physician: DIXIE MICHEL [[id number]]   Attending Physician: DIXIE MICHEL [[id number]]   Certification: I Certify That Inpatient Hospital Services Are Medically Necessary For Greater Than 2 Midnights            No follow-up provider specified.       Medication List      No changes were made to your prescriptions during this visit.          Conrad Izaguirre MD  01/16/22 8903

## 2022-01-17 ENCOUNTER — APPOINTMENT (OUTPATIENT)
Dept: CARDIOLOGY | Facility: HOSPITAL | Age: 87
End: 2022-01-17

## 2022-01-17 PROBLEM — I10 ESSENTIAL HYPERTENSION: Status: ACTIVE | Noted: 2022-01-17

## 2022-01-17 PROBLEM — N28.9 RENAL INSUFFICIENCY: Status: ACTIVE | Noted: 2022-01-17

## 2022-01-17 LAB
ALBUMIN SERPL-MCNC: 3.6 G/DL (ref 3.5–5.2)
ALBUMIN/GLOB SERPL: 1.7 G/DL
ALP SERPL-CCNC: 54 U/L (ref 39–117)
ALT SERPL W P-5'-P-CCNC: 11 U/L (ref 1–33)
ANION GAP SERPL CALCULATED.3IONS-SCNC: 10 MMOL/L (ref 5–15)
AST SERPL-CCNC: 20 U/L (ref 1–32)
BASOPHILS # BLD AUTO: 0.01 10*3/MM3 (ref 0–0.2)
BASOPHILS NFR BLD AUTO: 0.2 % (ref 0–1.5)
BH CV ECHO MEAS - BSA(HAYCOCK): 1.8 M^2
BH CV ECHO MEAS - BSA: 1.8 M^2
BH CV ECHO MEAS - BZI_BMI: 24.5 KILOGRAMS/M^2
BH CV ECHO MEAS - BZI_METRIC_HEIGHT: 167.6 CM
BH CV ECHO MEAS - BZI_METRIC_WEIGHT: 68.9 KG
BH CV LOWER VASCULAR LEFT COMMON FEMORAL AUGMENT: NORMAL
BH CV LOWER VASCULAR LEFT COMMON FEMORAL COMPETENT: NORMAL
BH CV LOWER VASCULAR LEFT COMMON FEMORAL COMPRESS: NORMAL
BH CV LOWER VASCULAR LEFT COMMON FEMORAL PHASIC: NORMAL
BH CV LOWER VASCULAR LEFT COMMON FEMORAL SPONT: NORMAL
BH CV LOWER VASCULAR LEFT DISTAL FEMORAL AUGMENT: NORMAL
BH CV LOWER VASCULAR LEFT DISTAL FEMORAL COMPETENT: NORMAL
BH CV LOWER VASCULAR LEFT DISTAL FEMORAL COMPRESS: NORMAL
BH CV LOWER VASCULAR LEFT DISTAL FEMORAL PHASIC: NORMAL
BH CV LOWER VASCULAR LEFT DISTAL FEMORAL SPONT: NORMAL
BH CV LOWER VASCULAR LEFT GASTRONEMIUS COMPRESS: NORMAL
BH CV LOWER VASCULAR LEFT GREATER SAPH AK COMPRESS: NORMAL
BH CV LOWER VASCULAR LEFT GREATER SAPH BK COMPRESS: NORMAL
BH CV LOWER VASCULAR LEFT LESSER SAPH COMPRESS: NORMAL
BH CV LOWER VASCULAR LEFT MID FEMORAL AUGMENT: NORMAL
BH CV LOWER VASCULAR LEFT MID FEMORAL COMPETENT: NORMAL
BH CV LOWER VASCULAR LEFT MID FEMORAL COMPRESS: NORMAL
BH CV LOWER VASCULAR LEFT MID FEMORAL PHASIC: NORMAL
BH CV LOWER VASCULAR LEFT MID FEMORAL SPONT: NORMAL
BH CV LOWER VASCULAR LEFT PERONEAL AUGMENT: NORMAL
BH CV LOWER VASCULAR LEFT PERONEAL COMPRESS: NORMAL
BH CV LOWER VASCULAR LEFT POPLITEAL AUGMENT: NORMAL
BH CV LOWER VASCULAR LEFT POPLITEAL COMPETENT: NORMAL
BH CV LOWER VASCULAR LEFT POPLITEAL COMPRESS: NORMAL
BH CV LOWER VASCULAR LEFT POPLITEAL PHASIC: NORMAL
BH CV LOWER VASCULAR LEFT POPLITEAL SPONT: NORMAL
BH CV LOWER VASCULAR LEFT POSTERIOR TIBIAL AUGMENT: NORMAL
BH CV LOWER VASCULAR LEFT POSTERIOR TIBIAL COMPRESS: NORMAL
BH CV LOWER VASCULAR LEFT PROFUNDA FEMORAL AUGMENT: NORMAL
BH CV LOWER VASCULAR LEFT PROFUNDA FEMORAL COMPETENT: NORMAL
BH CV LOWER VASCULAR LEFT PROFUNDA FEMORAL COMPRESS: NORMAL
BH CV LOWER VASCULAR LEFT PROFUNDA FEMORAL PHASIC: NORMAL
BH CV LOWER VASCULAR LEFT PROFUNDA FEMORAL SPONT: NORMAL
BH CV LOWER VASCULAR LEFT PROXIMAL FEMORAL AUGMENT: NORMAL
BH CV LOWER VASCULAR LEFT PROXIMAL FEMORAL COMPETENT: NORMAL
BH CV LOWER VASCULAR LEFT PROXIMAL FEMORAL COMPRESS: NORMAL
BH CV LOWER VASCULAR LEFT PROXIMAL FEMORAL PHASIC: NORMAL
BH CV LOWER VASCULAR LEFT PROXIMAL FEMORAL SPONT: NORMAL
BH CV LOWER VASCULAR LEFT SAPHENOFEMORAL JUNCTION AUGMENT: NORMAL
BH CV LOWER VASCULAR LEFT SAPHENOFEMORAL JUNCTION COMPETENT: NORMAL
BH CV LOWER VASCULAR LEFT SAPHENOFEMORAL JUNCTION COMPRESS: NORMAL
BH CV LOWER VASCULAR LEFT SAPHENOFEMORAL JUNCTION PHASIC: NORMAL
BH CV LOWER VASCULAR LEFT SAPHENOFEMORAL JUNCTION SPONT: NORMAL
BH CV LOWER VASCULAR RIGHT COMMON FEMORAL AUGMENT: NORMAL
BH CV LOWER VASCULAR RIGHT COMMON FEMORAL COMPETENT: NORMAL
BH CV LOWER VASCULAR RIGHT COMMON FEMORAL COMPRESS: NORMAL
BH CV LOWER VASCULAR RIGHT COMMON FEMORAL PHASIC: NORMAL
BH CV LOWER VASCULAR RIGHT COMMON FEMORAL SPONT: NORMAL
BH CV LOWER VASCULAR RIGHT DISTAL FEMORAL AUGMENT: NORMAL
BH CV LOWER VASCULAR RIGHT DISTAL FEMORAL COMPETENT: NORMAL
BH CV LOWER VASCULAR RIGHT DISTAL FEMORAL COMPRESS: NORMAL
BH CV LOWER VASCULAR RIGHT DISTAL FEMORAL PHASIC: NORMAL
BH CV LOWER VASCULAR RIGHT DISTAL FEMORAL SPONT: NORMAL
BH CV LOWER VASCULAR RIGHT GASTRONEMIUS COMPRESS: NORMAL
BH CV LOWER VASCULAR RIGHT GREATER SAPH AK COMPRESS: NORMAL
BH CV LOWER VASCULAR RIGHT GREATER SAPH BK COMPRESS: NORMAL
BH CV LOWER VASCULAR RIGHT LESSER SAPH COMPRESS: NORMAL
BH CV LOWER VASCULAR RIGHT MID FEMORAL AUGMENT: NORMAL
BH CV LOWER VASCULAR RIGHT MID FEMORAL COMPETENT: NORMAL
BH CV LOWER VASCULAR RIGHT MID FEMORAL COMPRESS: NORMAL
BH CV LOWER VASCULAR RIGHT MID FEMORAL PHASIC: NORMAL
BH CV LOWER VASCULAR RIGHT MID FEMORAL SPONT: NORMAL
BH CV LOWER VASCULAR RIGHT PERONEAL AUGMENT: NORMAL
BH CV LOWER VASCULAR RIGHT PERONEAL COMPRESS: NORMAL
BH CV LOWER VASCULAR RIGHT POPLITEAL AUGMENT: NORMAL
BH CV LOWER VASCULAR RIGHT POPLITEAL COMPETENT: NORMAL
BH CV LOWER VASCULAR RIGHT POPLITEAL COMPRESS: NORMAL
BH CV LOWER VASCULAR RIGHT POPLITEAL PHASIC: NORMAL
BH CV LOWER VASCULAR RIGHT POPLITEAL SPONT: NORMAL
BH CV LOWER VASCULAR RIGHT POSTERIOR TIBIAL AUGMENT: NORMAL
BH CV LOWER VASCULAR RIGHT POSTERIOR TIBIAL COMPRESS: NORMAL
BH CV LOWER VASCULAR RIGHT PROFUNDA FEMORAL AUGMENT: NORMAL
BH CV LOWER VASCULAR RIGHT PROFUNDA FEMORAL COMPETENT: NORMAL
BH CV LOWER VASCULAR RIGHT PROFUNDA FEMORAL COMPRESS: NORMAL
BH CV LOWER VASCULAR RIGHT PROFUNDA FEMORAL PHASIC: NORMAL
BH CV LOWER VASCULAR RIGHT PROFUNDA FEMORAL SPONT: NORMAL
BH CV LOWER VASCULAR RIGHT PROXIMAL FEMORAL AUGMENT: NORMAL
BH CV LOWER VASCULAR RIGHT PROXIMAL FEMORAL COMPETENT: NORMAL
BH CV LOWER VASCULAR RIGHT PROXIMAL FEMORAL COMPRESS: NORMAL
BH CV LOWER VASCULAR RIGHT PROXIMAL FEMORAL PHASIC: NORMAL
BH CV LOWER VASCULAR RIGHT PROXIMAL FEMORAL SPONT: NORMAL
BH CV LOWER VASCULAR RIGHT SAPHENOFEMORAL JUNCTION AUGMENT: NORMAL
BH CV LOWER VASCULAR RIGHT SAPHENOFEMORAL JUNCTION COMPETENT: NORMAL
BH CV LOWER VASCULAR RIGHT SAPHENOFEMORAL JUNCTION COMPRESS: NORMAL
BH CV LOWER VASCULAR RIGHT SAPHENOFEMORAL JUNCTION PHASIC: NORMAL
BH CV LOWER VASCULAR RIGHT SAPHENOFEMORAL JUNCTION SPONT: NORMAL
BILIRUB CONJ SERPL-MCNC: <0.2 MG/DL (ref 0–0.3)
BILIRUB SERPL-MCNC: <0.2 MG/DL (ref 0–1.2)
BUN SERPL-MCNC: 22 MG/DL (ref 8–23)
BUN/CREAT SERPL: 22.4 (ref 7–25)
CALCIUM SPEC-SCNC: 9.5 MG/DL (ref 8.6–10.5)
CHLORIDE SERPL-SCNC: 101 MMOL/L (ref 98–107)
CO2 SERPL-SCNC: 23 MMOL/L (ref 22–29)
CREAT SERPL-MCNC: 0.98 MG/DL (ref 0.57–1)
CRP SERPL-MCNC: 0.54 MG/DL (ref 0–0.5)
DEPRECATED RDW RBC AUTO: 44.2 FL (ref 37–54)
EOSINOPHIL # BLD AUTO: 0 10*3/MM3 (ref 0–0.4)
EOSINOPHIL NFR BLD AUTO: 0 % (ref 0.3–6.2)
ERYTHROCYTE [DISTWIDTH] IN BLOOD BY AUTOMATED COUNT: 13.4 % (ref 12.3–15.4)
FERRITIN SERPL-MCNC: 86.37 NG/ML (ref 13–150)
GFR SERPL CREATININE-BSD FRML MDRD: 53 ML/MIN/1.73
GLOBULIN UR ELPH-MCNC: 2.1 GM/DL
GLUCOSE SERPL-MCNC: 81 MG/DL (ref 65–99)
HCT VFR BLD AUTO: 32.7 % (ref 34–46.6)
HGB BLD-MCNC: 10.8 G/DL (ref 12–15.9)
IMM GRANULOCYTES # BLD AUTO: 0.02 10*3/MM3 (ref 0–0.05)
IMM GRANULOCYTES NFR BLD AUTO: 0.5 % (ref 0–0.5)
LYMPHOCYTES # BLD AUTO: 0.8 10*3/MM3 (ref 0.7–3.1)
LYMPHOCYTES NFR BLD AUTO: 19.6 % (ref 19.6–45.3)
MAXIMAL PREDICTED HEART RATE: 131 BPM
MCH RBC QN AUTO: 29.8 PG (ref 26.6–33)
MCHC RBC AUTO-ENTMCNC: 33 G/DL (ref 31.5–35.7)
MCV RBC AUTO: 90.1 FL (ref 79–97)
MONOCYTES # BLD AUTO: 0.65 10*3/MM3 (ref 0.1–0.9)
MONOCYTES NFR BLD AUTO: 15.9 % (ref 5–12)
NEUTROPHILS NFR BLD AUTO: 2.61 10*3/MM3 (ref 1.7–7)
NEUTROPHILS NFR BLD AUTO: 63.8 % (ref 42.7–76)
NRBC BLD AUTO-RTO: 0 /100 WBC (ref 0–0.2)
PLATELET # BLD AUTO: 150 10*3/MM3 (ref 140–450)
PMV BLD AUTO: 9.4 FL (ref 6–12)
POTASSIUM SERPL-SCNC: 3.7 MMOL/L (ref 3.5–5.2)
PROT SERPL-MCNC: 5.7 G/DL (ref 6–8.5)
RBC # BLD AUTO: 3.63 10*6/MM3 (ref 3.77–5.28)
SODIUM SERPL-SCNC: 134 MMOL/L (ref 136–145)
STRESS TARGET HR: 111 BPM
WBC NRBC COR # BLD: 4.09 10*3/MM3 (ref 3.4–10.8)

## 2022-01-17 PROCEDURE — 82728 ASSAY OF FERRITIN: CPT | Performed by: STUDENT IN AN ORGANIZED HEALTH CARE EDUCATION/TRAINING PROGRAM

## 2022-01-17 PROCEDURE — 86140 C-REACTIVE PROTEIN: CPT | Performed by: STUDENT IN AN ORGANIZED HEALTH CARE EDUCATION/TRAINING PROGRAM

## 2022-01-17 PROCEDURE — 25010000002 ENOXAPARIN PER 10 MG: Performed by: STUDENT IN AN ORGANIZED HEALTH CARE EDUCATION/TRAINING PROGRAM

## 2022-01-17 PROCEDURE — 94799 UNLISTED PULMONARY SVC/PX: CPT

## 2022-01-17 PROCEDURE — 82248 BILIRUBIN DIRECT: CPT | Performed by: EMERGENCY MEDICINE

## 2022-01-17 PROCEDURE — 93970 EXTREMITY STUDY: CPT

## 2022-01-17 PROCEDURE — 25010000005 REMDESIVIR 100 MG/20ML SOLUTION 1 EACH VIAL: Performed by: INTERNAL MEDICINE

## 2022-01-17 PROCEDURE — 80053 COMPREHEN METABOLIC PANEL: CPT | Performed by: STUDENT IN AN ORGANIZED HEALTH CARE EDUCATION/TRAINING PROGRAM

## 2022-01-17 PROCEDURE — 85025 COMPLETE CBC W/AUTO DIFF WBC: CPT | Performed by: STUDENT IN AN ORGANIZED HEALTH CARE EDUCATION/TRAINING PROGRAM

## 2022-01-17 PROCEDURE — 99232 SBSQ HOSP IP/OBS MODERATE 35: CPT | Performed by: INTERNAL MEDICINE

## 2022-01-17 PROCEDURE — 63710000001 DEXAMETHASONE PER 0.25 MG: Performed by: STUDENT IN AN ORGANIZED HEALTH CARE EDUCATION/TRAINING PROGRAM

## 2022-01-17 PROCEDURE — 94761 N-INVAS EAR/PLS OXIMETRY MLT: CPT

## 2022-01-17 PROCEDURE — 93970 EXTREMITY STUDY: CPT | Performed by: INTERNAL MEDICINE

## 2022-01-17 RX ORDER — ACETAMINOPHEN 325 MG/1
650 TABLET ORAL EVERY 6 HOURS PRN
Status: DISCONTINUED | OUTPATIENT
Start: 2022-01-17 | End: 2022-01-18 | Stop reason: HOSPADM

## 2022-01-17 RX ADMIN — FOLIC ACID 1 MG: 1 TABLET ORAL at 09:11

## 2022-01-17 RX ADMIN — DEXAMETHASONE 6 MG: 2 TABLET ORAL at 09:11

## 2022-01-17 RX ADMIN — AMLODIPINE BESYLATE 5 MG: 5 TABLET ORAL at 09:11

## 2022-01-17 RX ADMIN — LISINOPRIL 20 MG: 20 TABLET ORAL at 09:11

## 2022-01-17 RX ADMIN — ENOXAPARIN SODIUM 40 MG: 40 INJECTION SUBCUTANEOUS at 15:18

## 2022-01-17 RX ADMIN — Medication 400 UNITS: at 09:11

## 2022-01-17 RX ADMIN — Medication 1 TABLET: at 09:11

## 2022-01-17 RX ADMIN — REMDESIVIR 100 MG: 100 INJECTION, POWDER, LYOPHILIZED, FOR SOLUTION INTRAVENOUS at 14:04

## 2022-01-17 NOTE — PROGRESS NOTES
[facility] Medicine Services  PROGRESS NOTE    Patient Name: Alessio Kwon  : 1932  MRN: [id number]    Date of Admission: 2022  Primary Care Physician: Alice Kong MD    Subjective   Subjective     CC:  SOA     HPI:  No acute events. Poor sleep due to cough. Not feeling great but better.     ROS:  Gen- No fevers, chills  CV- No chest pain, palpitations  Resp- + cough, + dyspnea  GI- No N/V/D, abd pain       Objective   Objective     Vital Signs:   Temp:  [97 °F (36.1 °C)-100.4 °F (38 °C)] 97 °F (36.1 °C)  Heart Rate:  [43-89] 66  Resp:  [17-20] 18  BP: (107-170)/(52-87) 145/81  Flow (L/min):  [2] 2     Physical Exam:  Constitutional: No acute distress, awake, alert  HENT: NCAT, mucous membranes moist  Respiratory: Clear to auscultation bilaterally, respiratory effort normal; occ rhonchi   Cardiovascular: RRR, no murmurs, rubs, or gallops  Gastrointestinal: Positive bowel sounds, soft, nontender, nondistended  Musculoskeletal: No bilateral ankle edema  Psychiatric: Appropriate affect, cooperative  Neurologic: Oriented x 3, strength symmetric in all extremities, Cranial Nerves grossly intact to confrontation, speech clear  Skin: No rashes    Results Reviewed:  LAB RESULTS:      Lab 22  0636 22  0900   WBC 4.09 5.66   HEMOGLOBIN 10.8* 12.3   HEMATOCRIT 32.7* 36.3   PLATELETS 150 160   NEUTROS ABS 2.61 4.22   IMMATURE GRANS (ABS) 0.02 0.03   LYMPHS ABS 0.80 0.73   MONOS ABS 0.65 0.64   EOS ABS 0.00 0.02   MCV 90.1 87.7   CRP 0.54* 0.34   PROCALCITONIN  --  0.06   LACTATE  --  0.9   LDH  --  222*   D DIMER QUANT  --  1.94*         Lab 22  0636 22  1234 22  0900   SODIUM 134*  --  134*   POTASSIUM 3.7  --  4.2   CHLORIDE 101  --  97*   CO2 23.0  --  23.0   ANION GAP 10.0  --  14.0   BUN -     CREATININE 0.98 1.04* 1.04*   GLUCOSE 81  --  90   CALCIUM 9.5  --  9.9         Lab 22  0636 22  0900   TOTAL PROTEIN 5.7* 6.4  6.4    ALBUMIN 3.60 4.30  4.30   GLOBULIN 2.1 2.1   ALT (SGPT) 11 12  12   AST (SGOT) 20 27  26   BILIRUBIN <0.2 0.3  0.4   BILIRUBIN DIRECT <0.2 <0.2   ALK PHOS 54 69  69         Lab 01/16/22  0928 01/16/22  0900   PROBNP  --  443.5   TROPONIN T <0.010  --              Lab 01/16/22  0900   FERRITIN 79.44         Brief Urine Lab Results     None          Microbiology Results Abnormal     None          XR Chest 1 View    Result Date: 1/16/2022  EXAMINATION: XR CHEST 1 VW-  INDICATION: SOA triage protocol  COMPARISON: 9/25/2017  FINDINGS: Mild chronic changes of the lung fields are present without focal airspace opacity. There is no significant pleural effusion or distinct pneumothorax. Normal heart and mediastinal contours.      Impression: Mild chronic changes of the lung fields without evidence of acute cardiopulmonary abnormality.  This report was finalized on 1/16/2022 9:26 AM by Jay Bautista.      CT Angiogram Chest    Result Date: 1/16/2022  EXAMINATION: CT ANGIOGRAM CHEST-  INDICATION: covid, yung, + d dimer; U07.1-COVID-19; R09.02-Hypoxemia; R79.89-Other specified abnormal findings of blood chemistry; E87.6-Dhws-tmasdaxoed and hyponatremia  TECHNIQUE: Axial pulmonary arterial phase IV contrast enhanced CT angiogram of the chest per PE protocol. Two-dimensional reconstructions were postprocessed.  The radiation dose reduction device was turned on for each scan per the ALARA (As Low as Reasonably Achievable) protocol.  COMPARISON: 9/28/2017  FINDINGS: No pathologic axillary adenopathy or other worrisome body wall soft tissue finding in the chest. No acute findings in the partially imaged upper abdomen. No pleural or pericardial effusion. No pathologic mediastinal or hilar lymphadenopathy. Evaluation of the osseous structures demonstrates no evidence of acute fracture or aggressive osseous lesion. Atherosclerotic nonaneurysmal thoracic aorta. Evaluation of the pulmonary arteries demonstrates no evidence of  filling defect concerning for acute pulmonary embolus. Evaluation of the lung fields demonstrates some minimal peribronchial tree-in-bud nodularity in the lingula and right upper lobe, likely minimal chronic or atypical pneumonia. There is otherwise no evidence of acute infectious or inflammatory process.      Impression: No pulmonary embolus.  Evaluation of the lung fields demonstrates some minimal peribronchial tree-in-bud nodularity in the lingula and right upper lobe, likely minimal chronic or atypical pneumonia. There is otherwise no evidence of acute infectious or inflammatory process.   This report was finalized on 1/16/2022 11:05 AM by Jay Bautista.            I have reviewed the medications:  Scheduled Meds:amLODIPine, 5 mg, Oral, Q24H  Cholecalciferol, 400 Units, Oral, Daily  dexamethasone, 6 mg, Oral, Daily   Or  dexamethasone, 6 mg, Intravenous, Daily  enoxaparin, 40 mg, Subcutaneous, Q24H  folic acid, 1 mg, Oral, Daily  lisinopril, 20 mg, Oral, Daily  multivitamin with minerals, 1 tablet, Oral, Daily      Continuous Infusions:   PRN Meds:.benzonatate  •  docusate sodium  •  sodium chloride    Assessment/Plan   Assessment & Plan     Active Hospital Problems    Diagnosis  POA   • COVID-19 [U07.1]  Yes      Resolved Hospital Problems   No resolved problems to display.        Brief Hospital Course to date:  Alessio Kwon is an 89-year-old female with a history of breast cancer, hypertension, rheumatoid arthritis who is admitted for acute hypoxemic respiratory failure secondary suspected to COVID-19 pneumonia.  She is vaccinated and boosted.  Symptoms first started on 1/15/2022     Acute hypoxemic respiratory failure  COVID-19 pneumonia  - Vaccinated and boosted  - Symptoms 1/15; positive 1/16  - CTA with no PE; minimal peribronchial tree in bud nodularity likely minimal chronic or atypical PNA   - Dexamethasone (1/16) - D2  - DVT ppx - lovenox   - Will start remdesivir after reviewing risk/benefit  with patient -- D2   - Currently on 2L; mobilize, pulm toilet      Elevation in creatinine  - Cr improved with fluids; monitor      Hypertension  -home meds amlodipine and lisinopril continued  - BP controlled        DVT prophylaxis:  Medical DVT prophylaxis orders are present.            Disposition: I expect the patient to be discharged TBD    CODE STATUS:   Code Status and Medical Interventions:   Ordered at: 01/16/22 1314     Level Of Support Discussed With:    Patient    Next of Kin (If No Surrogate)     Code Status (Patient has no pulse and is not breathing):    No CPR (Do Not Attempt to Resuscitate)     Medical Interventions (Patient has pulse or is breathing):    Full Support       Negra Bailey DO  01/17/22

## 2022-01-17 NOTE — PLAN OF CARE
Goal Outcome Evaluation:  Plan of Care Reviewed With: patient        Progress: no change  Outcome Summary: Patient concerned about bradycardia this shift. Explained to patient this was related to Remdesivir and this drug had been discontinued. VSS overnight. Pt on RA until she fell asleep, then required 2L NC. No complaints overnight. Continue current POC.

## 2022-01-17 NOTE — CASE MANAGEMENT/SOCIAL WORK
Discharge Planning Assessment  [facility]     Patient Name: Alessio Kwon  MRN: [id number]  Today's Date: 1/17/2022    Admit Date: 1/16/2022     Discharge Needs Assessment     Row Name 01/17/22 1413       Living Environment    Lives With child(gavino), adult    Current Living Arrangements home/apartment/condo    Primary Care Provided by self    Provides Primary Care For no one    Family Caregiver if Needed child(gavino), adult    Quality of Family Relationships helpful; involved; supportive    Able to Return to Prior Arrangements yes       Resource/Environmental Concerns    Resource/Environmental Concerns none    Transportation Concerns car, none       Transition Planning    Patient/Family Anticipates Transition to home with family    Patient/Family Anticipated Services at Transition none    Transportation Anticipated family or friend will provide       Discharge Needs Assessment    Readmission Within the Last 30 Days no previous admission in last 30 days    Equipment Currently Used at Home cane, straight    Concerns to be Addressed discharge planning    Anticipated Changes Related to Illness none               Discharge Plan     Row Name 01/17/22 1413       Plan    Plan Initial CM eval    Plan Comments Patient lives in [facility] with family and uses a cane with ambulation. She is currently on 2L of 02 but does not wear 02 at baseline. Remdesivir has been started - today is day 2. Goal is to return home upon discharge- CM will continue to follow - allan [phone]              Continued Care and Services - Admitted Since 1/16/2022    Coordination has not been started for this encounter.          Demographic Summary     Row Name 01/17/22 1412       General Information    Admission Type inpatient    Arrived From home    Referral Source admission list    Reason for Consult discharge planning    Preferred Language English       Contact Information    Permission Granted to Share Info With                 Functional Status     Row Name 01/17/22 1412       Functional Status    Usual Activity Tolerance moderate    Current Activity Tolerance moderate       Functional Status, IADL    Medications assistive person    Meal Preparation assistive person    Housekeeping assistive person    Laundry assistive person    Shopping assistive person    IADL Comments family and uses cane with ambulation               Psychosocial    No documentation.                Abuse/Neglect    No documentation.                Legal    No documentation.                Substance Abuse    No documentation.                Patient Forms    No documentation.                   Farhana Velasquez RN

## 2022-01-18 ENCOUNTER — READMISSION MANAGEMENT (OUTPATIENT)
Dept: CALL CENTER | Facility: HOSPITAL | Age: 87
End: 2022-01-18

## 2022-01-18 VITALS
TEMPERATURE: 97.1 F | HEIGHT: 66 IN | SYSTOLIC BLOOD PRESSURE: 133 MMHG | BODY MASS INDEX: 24.43 KG/M2 | RESPIRATION RATE: 18 BRPM | OXYGEN SATURATION: 96 % | HEART RATE: 60 BPM | DIASTOLIC BLOOD PRESSURE: 71 MMHG | WEIGHT: 152 LBS

## 2022-01-18 PROBLEM — M06.9 RHEUMATOID ARTHRITIS INVOLVING MULTIPLE SITES: Status: ACTIVE | Noted: 2022-01-18

## 2022-01-18 PROBLEM — N28.9 RENAL INSUFFICIENCY: Status: RESOLVED | Noted: 2022-01-17 | Resolved: 2022-01-18

## 2022-01-18 LAB
ALBUMIN SERPL-MCNC: 3.8 G/DL (ref 3.5–5.2)
ALBUMIN/GLOB SERPL: 1.7 G/DL
ALP SERPL-CCNC: 56 U/L (ref 39–117)
ALT SERPL W P-5'-P-CCNC: 12 U/L (ref 1–33)
ANION GAP SERPL CALCULATED.3IONS-SCNC: 11 MMOL/L (ref 5–15)
AST SERPL-CCNC: 23 U/L (ref 1–32)
BASOPHILS # BLD AUTO: 0.01 10*3/MM3 (ref 0–0.2)
BASOPHILS NFR BLD AUTO: 0.2 % (ref 0–1.5)
BILIRUB SERPL-MCNC: 0.2 MG/DL (ref 0–1.2)
BUN SERPL-MCNC: 24 MG/DL (ref 8–23)
BUN/CREAT SERPL: 23.5 (ref 7–25)
CALCIUM SPEC-SCNC: 9.6 MG/DL (ref 8.6–10.5)
CHLORIDE SERPL-SCNC: 102 MMOL/L (ref 98–107)
CO2 SERPL-SCNC: 24 MMOL/L (ref 22–29)
CREAT SERPL-MCNC: 1.02 MG/DL (ref 0.57–1)
CRP SERPL-MCNC: 0.32 MG/DL (ref 0–0.5)
DEPRECATED RDW RBC AUTO: 44 FL (ref 37–54)
EOSINOPHIL # BLD AUTO: 0 10*3/MM3 (ref 0–0.4)
EOSINOPHIL NFR BLD AUTO: 0 % (ref 0.3–6.2)
ERYTHROCYTE [DISTWIDTH] IN BLOOD BY AUTOMATED COUNT: 13.7 % (ref 12.3–15.4)
FERRITIN SERPL-MCNC: 94.62 NG/ML (ref 13–150)
GFR SERPL CREATININE-BSD FRML MDRD: 51 ML/MIN/1.73
GLOBULIN UR ELPH-MCNC: 2.2 GM/DL
GLUCOSE SERPL-MCNC: 83 MG/DL (ref 65–99)
HCT VFR BLD AUTO: 33.7 % (ref 34–46.6)
HGB BLD-MCNC: 11.3 G/DL (ref 12–15.9)
IMM GRANULOCYTES # BLD AUTO: 0.01 10*3/MM3 (ref 0–0.05)
IMM GRANULOCYTES NFR BLD AUTO: 0.2 % (ref 0–0.5)
LYMPHOCYTES # BLD AUTO: 1.17 10*3/MM3 (ref 0.7–3.1)
LYMPHOCYTES NFR BLD AUTO: 23.4 % (ref 19.6–45.3)
MCH RBC QN AUTO: 29.3 PG (ref 26.6–33)
MCHC RBC AUTO-ENTMCNC: 33.5 G/DL (ref 31.5–35.7)
MCV RBC AUTO: 87.3 FL (ref 79–97)
MONOCYTES # BLD AUTO: 0.53 10*3/MM3 (ref 0.1–0.9)
MONOCYTES NFR BLD AUTO: 10.6 % (ref 5–12)
NEUTROPHILS NFR BLD AUTO: 3.27 10*3/MM3 (ref 1.7–7)
NEUTROPHILS NFR BLD AUTO: 65.6 % (ref 42.7–76)
NRBC BLD AUTO-RTO: 0 /100 WBC (ref 0–0.2)
PLATELET # BLD AUTO: 161 10*3/MM3 (ref 140–450)
PMV BLD AUTO: 9.6 FL (ref 6–12)
POTASSIUM SERPL-SCNC: 3.8 MMOL/L (ref 3.5–5.2)
PROT SERPL-MCNC: 6 G/DL (ref 6–8.5)
RBC # BLD AUTO: 3.86 10*6/MM3 (ref 3.77–5.28)
SODIUM SERPL-SCNC: 137 MMOL/L (ref 136–145)
WBC NRBC COR # BLD: 4.99 10*3/MM3 (ref 3.4–10.8)

## 2022-01-18 PROCEDURE — 63710000001 DEXAMETHASONE PER 0.25 MG: Performed by: STUDENT IN AN ORGANIZED HEALTH CARE EDUCATION/TRAINING PROGRAM

## 2022-01-18 PROCEDURE — 82728 ASSAY OF FERRITIN: CPT | Performed by: STUDENT IN AN ORGANIZED HEALTH CARE EDUCATION/TRAINING PROGRAM

## 2022-01-18 PROCEDURE — 86140 C-REACTIVE PROTEIN: CPT | Performed by: STUDENT IN AN ORGANIZED HEALTH CARE EDUCATION/TRAINING PROGRAM

## 2022-01-18 PROCEDURE — 85025 COMPLETE CBC W/AUTO DIFF WBC: CPT | Performed by: STUDENT IN AN ORGANIZED HEALTH CARE EDUCATION/TRAINING PROGRAM

## 2022-01-18 PROCEDURE — 97161 PT EVAL LOW COMPLEX 20 MIN: CPT

## 2022-01-18 PROCEDURE — 97165 OT EVAL LOW COMPLEX 30 MIN: CPT

## 2022-01-18 PROCEDURE — 97116 GAIT TRAINING THERAPY: CPT

## 2022-01-18 PROCEDURE — 80053 COMPREHEN METABOLIC PANEL: CPT | Performed by: STUDENT IN AN ORGANIZED HEALTH CARE EDUCATION/TRAINING PROGRAM

## 2022-01-18 PROCEDURE — 99239 HOSP IP/OBS DSCHRG MGMT >30: CPT | Performed by: INTERNAL MEDICINE

## 2022-01-18 RX ORDER — PREDNISONE 2.5 MG
2.5 TABLET ORAL DAILY
Start: 2022-01-18

## 2022-01-18 RX ORDER — BENZONATATE 100 MG/1
100 CAPSULE ORAL 3 TIMES DAILY PRN
Qty: 30 CAPSULE | Refills: 0 | Status: SHIPPED | OUTPATIENT
Start: 2022-01-18

## 2022-01-18 RX ORDER — DEXAMETHASONE 6 MG/1
6 TABLET ORAL DAILY
Qty: 7 TABLET | Refills: 0 | Status: SHIPPED | OUTPATIENT
Start: 2022-01-19 | End: 2022-01-18

## 2022-01-18 RX ORDER — DEXAMETHASONE 6 MG/1
6 TABLET ORAL DAILY
Qty: 7 TABLET | Refills: 0 | Status: SHIPPED | OUTPATIENT
Start: 2022-01-19 | End: 2022-01-26

## 2022-01-18 RX ORDER — UPADACITINIB 15 MG/1
1 TABLET, EXTENDED RELEASE ORAL DAILY
Qty: 30 TABLET
Start: 2022-01-18

## 2022-01-18 RX ORDER — BENZONATATE 100 MG/1
100 CAPSULE ORAL 3 TIMES DAILY PRN
Qty: 30 CAPSULE | Refills: 0 | Status: SHIPPED | OUTPATIENT
Start: 2022-01-18 | End: 2022-01-18

## 2022-01-18 RX ADMIN — DEXAMETHASONE 6 MG: 2 TABLET ORAL at 08:03

## 2022-01-18 RX ADMIN — LISINOPRIL 20 MG: 20 TABLET ORAL at 08:03

## 2022-01-18 RX ADMIN — ACETAMINOPHEN 650 MG: 325 TABLET, FILM COATED ORAL at 08:11

## 2022-01-18 RX ADMIN — AMLODIPINE BESYLATE 5 MG: 5 TABLET ORAL at 08:03

## 2022-01-18 RX ADMIN — Medication 1 TABLET: at 08:03

## 2022-01-18 RX ADMIN — FOLIC ACID 1 MG: 1 TABLET ORAL at 08:03

## 2022-01-18 NOTE — SIGNIFICANT NOTE
Patient's heart rate dropping as low as 31 while asleep. /66.   Discontinued Remdesivir and will defer to a.m. provider whether to restart medication.

## 2022-01-18 NOTE — CASE MANAGEMENT/SOCIAL WORK
Continued Stay Note   [facility]     Patient Name: Alessio Kwon  MRN: [id number]  Today's Date: 1/18/2022    Admit Date: 1/16/2022     Discharge Plan     Row Name 01/18/22 1321       Plan    Plan CM update- Home at discharge    Final Note Patient discharged home today - she returned home where she lives with her daughter who is a physician and her family. Patient has been weaned to room air and they did not have any additonal DME needs. No other needs identified for home - Farhana [phone]               Discharge Codes    No documentation.               Expected Discharge Date and Time     Expected Discharge Date Expected Discharge Time    Jan 18, 2022             Farhana Velasquez RN

## 2022-01-18 NOTE — THERAPY DISCHARGE NOTE
Acute Care - Occupational Therapy Discharge  [facility]    Patient Name: Alessio Kwon  : 1932    MRN: [id number]                              Today's Date: 2022       Admit Date: 2022    Visit Dx:     ICD-10-CM ICD-9-CM   1. COVID-19  U07.1 079.89   2. Hypoxia  R09.02 799.02   3. Positive D dimer  R79.89 790.92   4. Hyponatremia  E87.1 276.1     Patient Active Problem List   Diagnosis   • Malignant neoplasm of upper-outer quadrant of left female breast (HCC)   • COVID-19   • Essential hypertension   • Renal insufficiency     Past Medical History:   Diagnosis Date   • Arthritis    • Breast cancer (HCC) 2016    left   • Cancer (HCC)     skin cancer   • Hx of radiation therapy 2016    left breast   • Hypertension    • RA (rheumatoid arthritis) (HCC)      Past Surgical History:   Procedure Laterality Date   • BREAST BIOPSY Left 06/10/2016    US bx   • BREAST CYST EXCISION Right    • BREAST LUMPECTOMY Left 2016      General Information     Row Name 22 1006          OT Time and Intention    Document Type evaluation; discharge evaluation/summary  -KEO     Mode of Treatment occupational therapy  -KEO     Row Name 22 1006          General Information    Patient Profile Reviewed yes  -KEO     Prior Level of Function independent:; all household mobility; ADL's; max assist:; home management  -KEO     Existing Precautions/Restrictions fall  -KEO     Barriers to Rehab previous functional deficit  -KEO     Row Name 22 1006          Occupational Profile    Environmental Supports and Barriers (Occupational Profile) wx in shower with grab bars and built in seat, high self cleaning toilet, cane, ramp into garage  -KEO     Row Name 22 1006          Living Environment    Lives With child(gavino), adult  -KEO     Row Name 22 1006          Home Main Entrance    Number of Stairs, Main Entrance none  ramp  -KEO     Row Name 22 1006          Stairs Within Home, Primary    Number of  Stairs, Within Home, Primary none  -     Row Name 01/18/22 1006          Cognition    Orientation Status (Cognition) oriented x 3  -     Row Name 01/18/22 1006          Safety Issues, Functional Mobility    Impairments Affecting Function (Mobility) balance  -           User Key  (r) = Recorded By, (t) = Taken By, (c) = Cosigned By    Initials Name Provider Type     Bee Murrell, OT Occupational Therapist               Mobility/ADL's     Row Name 01/18/22 1007          Bed Mobility    Bed Mobility supine-sit  -KEO     Supine-Sit [place] (Bed Mobility) modified independence  -     Assistive Device (Bed Mobility) head of bed elevated; bed rails  -     Comment (Bed Mobility) good easy movement to EOB  -     Row Name 01/18/22 1007          Transfers    Transfers sit-stand transfer; toilet transfer  -     Sit-Stand [place] (Transfers) standby assist  -     [place] Level (Toilet Transfer) independent  -     Assistive Device (Toilet Transfer) commode  -     Row Name 01/18/22 1007          Sit-Stand Transfer    Assistive Device (Sit-Stand Transfers) cane, straight  -     Row Name 01/18/22 1007          Toilet Transfer    Type (Toilet Transfer) sit-stand; stand-sit  -     Row Name 01/18/22 1007          Functional Mobility    Functional Mobility- Ind. Level standby assist  -     Functional Mobility- Device straight cane  -     Functional Mobility-Distance (Feet) 48  -     Functional Mobility- Safety Issues step length decreased  -     Functional Mobility- Comment to bathroom twice with 02 check in between in upper 90's, pt. tending to carry cane  -     Row Name 01/18/22 1007          Activities of Daily Living    BADL Assessment/Intervention lower body dressing; grooming; toileting  -     Row Name 01/18/22 1007          Lower Body Dressing Assessment/Training    [place] Level (Lower Body Dressing) doff; don; socks; independent  -     Position (Lower Body Dressing)  unsupported sitting  -     Row Name 01/18/22 1007          Grooming Assessment/Training    [place] Level (Grooming) wash face, hands; oral care regimen; hair care, combing/brushing; supervision  -     Position (Grooming) sink side  -     Comment (Grooming) some assist to open packaging  -[facility] Name 01/18/22 1007          Toileting Assessment/Training    [place] Level (Toileting) change pad/brief; perform perineal hygiene; supervision  -     Assistive Devices (Toileting) commode  -     Position (Toileting) unsupported sitting; unsupported standing  -           User Key  (r) = Recorded By, (t) = Taken By, (c) = Cosigned By    Initials Name Provider Type    KEO Bee Murrell, OT Occupational Therapist               Obj/Interventions     [facility] Name 01/18/22 1010          Sensory Assessment (Somatosensory)    Sensory Assessment (Somatosensory) UE sensation intact  -[facility] Name 01/18/22 1010          Vision Assessment/Intervention    Visual Impairment/Limitations corrective lenses for reading  -[facility] Name 01/18/22 1010          Range of Motion Comprehensive    General Range of Motion bilateral upper extremity ROM WNL  -[facility] Name 01/18/22 1010          Strength Comprehensive (MMT)    Comment, General Manual Muscle Testing (MMT) Assessment UE strength good for BADL completion and bed mobility  -[facility] Name 01/18/22 1010          Motor Skills    Motor Skills functional endurance  -     Functional Endurance with several spot checks post ADL tasks and mvmt to bathroom and back pt. upper 90 02 sats on room air  -[facility] Name 01/18/22 1010          Balance    Balance Assessment sitting static balance; sitting dynamic balance; standing static balance; standing dynamic balance  -     Static Sitting Balance WFL; unsupported; sitting in chair; sitting, edge of bed  -     Dynamic Sitting Balance WFL; unsupported; sitting in chair; sitting, edge of bed  -     Static Standing  Balance WFL; unsupported; standing  -KEO     Dynamic Standing Balance WFL; unsupported; standing  grooming and dressing tasks and toileting  -     Balance Interventions occupation based/functional task; sit to stand  -KEO           User Key  (r) = Recorded By, (t) = Taken By, (c) = Cosigned By    Initials Name Provider Type    Bee Lorenzo, OT Occupational Therapist               Goals/Plan    No documentation.                Clinical Impression     [facility] Name 01/18/22 1011          Pain Assessment    Additional Documentation Pain Scale: Numbers Pre/Post-Treatment (Group)  -KEO     Row Name 01/18/22 1011          Pain Scale: Numbers Pre/Post-Treatment    Pretreatment Pain Rating 0/10 - no pain  -KEO     Posttreatment Pain Rating 0/10 - no pain  -[facility] Name 01/18/22 1011          Plan of Care Review    Plan of Care Reviewed With patient  -     Progress no change  -     Outcome Summary OT evaluation completed with pt. demonstrating good strength, balance and endurance for independent BADL performance with 02 sats in upper 90's.  Pt. with needed AD at home.  Recommend home with family support at discharge.  No skilled OT services needed.  Defer high level balance to PT services.  -[facility] Name 01/18/22 1011          Therapy Assessment/Plan (OT)    Criteria for Skilled Therapeutic Interventions Met (OT) no; skilled treatment is necessary  -     Therapy Frequency (OT) daily  -KEO     Row Name 01/18/22 1011          Therapy Plan Review/Discharge Plan (OT)    Anticipated Discharge Disposition (OT) home with assist  -KEO     Row Name 01/18/22 1011          Vital Signs    Pre Systolic BP Rehab 164  -KEO     Pre Treatment Diastolic BP 76  -KEO     Pretreatment Heart Rate (beats/min) 59  -KEO     Pre SpO2 (%) 94  -KEO     O2 Delivery Pre Treatment room air  -KEO     Intra SpO2 (%) 97  -KEO     O2 Delivery Intra Treatment room air  -KEO     Post SpO2 (%) 97  -KEO     O2 Delivery Post Treatment room air  -KEO     Pre  Patient Position Supine  -KEO     Intra Patient Position Standing  -KEO     Post Patient Position Sitting  -KEO     Row Name 01/18/22 1011          Positioning and Restraints    Pre-Treatment Position in bed  -KEO     Post Treatment Position other  -KEO     Other Position with other staff  with PT  -EKO           User Key  (r) = Recorded By, (t) = Taken By, (c) = Cosigned By    Initials Name Provider Type    Bee Lorenzo OT Occupational Therapist               Outcome Measures     Row Name 01/18/22 1014          How much help from another is currently needed...    Putting on and taking off regular lower body clothing? 4  -KEO     Bathing (including washing, rinsing, and drying) 3  S for safety until cleared by PT high level balance  -KEO     Toileting (which includes using toilet bed pan or urinal) 4  -KEO     Putting on and taking off regular upper body clothing 4  -KEO     Taking care of personal grooming (such as brushing teeth) 4  -KEO     Eating meals 4  -KEO     AM-PAC 6 Clicks Score (OT) 23  -KEO     Row Name 01/18/22 1014          Functional Assessment    Outcome Measure Options AM-PAC 6 Clicks Daily Activity (OT)  -KEO           User Key  (r) = Recorded By, (t) = Taken By, (c) = Cosigned By    Initials Name Provider Type    Bee Lorenzo, SERJIO Occupational Therapist              Occupational Therapy Education                 Title: PT OT SLP Therapies (In Progress)     Topic: Occupational Therapy (In Progress)     Point: ADL training (Not Started)     Description:   Instruct learner(s) on proper safety adaptation and remediation techniques during self care or transfers.   Instruct in proper use of assistive devices.              Learner Progress:  Not documented in this visit.          Point: Home exercise program (Done)     Description:   Instruct learner(s) on appropriate technique for monitoring, assisting and/or progressing therapeutic exercises/activities.              Learning Progress Summary            Patient Acceptance, E,D, VU by KEO at 1/18/2022 1015    Comment: reason for consult, OT evaluation results, benefit of UE TE on own to build and maintain strength                   Point: Precautions (Not Started)     Description:   Instruct learner(s) on prescribed precautions during self-care and functional transfers.              Learner Progress:  Not documented in this visit.          Point: Body mechanics (Not Started)     Description:   Instruct learner(s) on proper positioning and spine alignment during self-care, functional mobility activities and/or exercises.              Learner Progress:  Not documented in this visit.                      User Key     Initials Effective Dates Name Provider Type Discipline     06/16/21 -  Bee Murrell, OT Occupational Therapist OT              OT Recommendation and Plan  Retired Outcome Summary/Treatment Plan (OT)  Anticipated Discharge Disposition (OT): home with assist  Therapy Frequency (OT): daily  Plan of Care Review  Plan of Care Reviewed With: patient  Progress: no change  Outcome Summary: OT evaluation completed with pt. demonstrating good strength, balance and endurance for independent BADL performance with 02 sats in upper 90's.  Pt. with needed AD at home.  Recommend home with family support at discharge.  No skilled OT services needed.  Defer high level balance to PT services.  Plan of Care Reviewed With: patient  Outcome Summary: OT evaluation completed with pt. demonstrating good strength, balance and endurance for independent BADL performance with 02 sats in upper 90's.  Pt. with needed AD at home.  Recommend home with family support at discharge.  No skilled OT services needed.  Defer high level balance to PT services.     Time Calculation:    Time Calculation- OT     Row Name 01/18/22 1016             Time Calculation- OT    OT Start Time 0851  -KEO      OT Received On 01/18/22  -KEO              Untimed Charges    OT Eval/Re-eval Minutes 36  -KEO               Total Minutes    Untimed Charges Total Minutes 36  -KEO       Total Minutes 36  -KEO            User Key  (r) = Recorded By, (t) = Taken By, (c) = Cosigned By    Initials Name Provider Type    Bee Lorenzo, SERJIO Occupational Therapist              Therapy Charges for Today     Code Description Service Date Service Provider Modifiers Qty    82496440722  OT EVAL LOW COMPLEXITY 3 1/18/2022 Bee Murrell OT GO 1               Bee Murrell OT  1/18/2022

## 2022-01-18 NOTE — PLAN OF CARE
Goal Outcome Evaluation:  Plan of Care Reviewed With: patient        Progress: no change  Outcome Summary: OT evaluation completed with pt. demonstrating good strength, balance and endurance for independent BADL performance with 02 sats in upper 90's.  Pt. with needed AD at home.  Recommend home with family support at discharge.  No skilled OT services needed.  Defer high level balance to PT services.

## 2022-01-18 NOTE — THERAPY EVALUATION
Patient Name: Alessio Kwon  : 1932    MRN: [id number]                              Today's Date: 2022       Admit Date: 2022    Visit Dx:     ICD-10-CM ICD-9-CM   1. COVID-19  U07.1 079.89   2. Hypoxia  R09.02 799.02   3. Positive D dimer  R79.89 790.92   4. Hyponatremia  E87.1 276.1     Patient Active Problem List   Diagnosis   • Malignant neoplasm of upper-outer quadrant of left female breast (HCC)   • COVID-19   • Essential hypertension   • Renal insufficiency     Past Medical History:   Diagnosis Date   • Arthritis    • Breast cancer (HCC) 2016    left   • Cancer (HCC)     skin cancer   • Hx of radiation therapy 2016    left breast   • Hypertension    • RA (rheumatoid arthritis) (HCC)      Past Surgical History:   Procedure Laterality Date   • BREAST BIOPSY Left 06/10/2016    US bx   • BREAST CYST EXCISION Right 's   • BREAST LUMPECTOMY Left 2016      General Information     Row Name 22 1123          Physical Therapy Time and Intention    Document Type evaluation  -AY     Mode of Treatment physical therapy  -AY     Row Name 22 1123          General Information    Patient Profile Reviewed yes  -AY     Prior Level of Function independent:; all household mobility; community mobility; gait; transfer; using stairs; bed mobility  -AY     Existing Precautions/Restrictions fall  -AY     Barriers to Rehab previous functional deficit  -AY     Row Name 22 1123          Living Environment    Lives With child(gavino), adult  -AY     Row Name 22 1123          Home Main Entrance    Number of Stairs, Main Entrance none; other (see comments)  ramp to enter  -AY     Row Name 22 1123          Stairs Within Home, Primary    Number of Stairs, Within Home, Primary none  -AY     Row Name 22 1123          Cognition    Orientation Status (Cognition) oriented x 3  -AY     Row Name 22 1123          Safety Issues, Functional Mobility    Impairments Affecting Function  (Mobility) balance; strength  -AY           User Key  (r) = Recorded By, (t) = Taken By, (c) = Cosigned By    Initials Name Provider Type    Aida Gaston PT Physical Therapist               Mobility     Row Name 01/18/22 1124          Bed Mobility    Comment (Bed Mobility) pt received and left sitting up in chair  -AY     Row Name 01/18/22 1124          Transfers    Comment (Transfers) verbal cueing for sequencing  -AY     Row Name 01/18/22 1124          Sit-Stand Transfer    Sit-Stand [place] (Transfers) standby assist  -AY     Row Name 01/18/22 1124          Gait/Stairs (Locomotion)    [place] Level (Gait) minimum assist (75% patient effort); 1 person assist  -AY     Assistive Device (Gait) other (see comments)  R HHA  -AY     Distance in Feet (Gait) 110  -AY     Deviations/Abnormal Patterns (Gait) ruel decreased; festinating/shuffling; bilateral deviations; stride length decreased; base of support, narrow; gait speed decreased  -AY     Bilateral Gait Deviations heel strike decreased; forward flexed posture  -AY     Comment (Gait/Stairs) pt demonstrated shuffling step through gait pattern with decreased ruel and flexed posture. Verbal cueing for posture, increased stride length, and increased FLEX. Gait distance limited by fatigue. O2 remained >90% on RA. Mild instability noted (min A requried) with dynamic gait activities, with SBA required for straight plane ambulation.  -AY           User Key  (r) = Recorded By, (t) = Taken By, (c) = Cosigned By    Initials Name Provider Type    Aida Gaston PT Physical Therapist               Obj/Interventions     Row Name 01/18/22 1125          Range of Motion Comprehensive    General Range of Motion bilateral lower extremity ROM WFL  -AY     Row Name 01/18/22 1125          Strength Comprehensive (MMT)    General Manual Muscle Testing (MMT) Assessment lower extremity strength deficits identified  -AY     Comment, General Manual Muscle Testing (MMT)  Assessment BLE grossly 4/5.  -AY     Row Name 01/18/22 1125          Balance    Balance Assessment sitting static balance; sitting dynamic balance; standing static balance; standing dynamic balance  -AY     Static Sitting Balance WFL; sitting in chair  -AY     Dynamic Sitting Balance WFL; sitting in chair  -AY     Static Standing Balance mild impairment; unsupported; standing  -AY     Dynamic Standing Balance unsupported; standing; mild impairment  -AY     Comment, Balance mild instability with SLS, narrow stance, and EC activties.  -AY     Row Name 01/18/22 1125          Sensory Assessment (Somatosensory)    Sensory Assessment (Somatosensory) LE sensation intact  -AY           User Key  (r) = Recorded By, (t) = Taken By, (c) = Cosigned By    Initials Name Provider Type    Aida Gaston PT Physical Therapist               Goals/Plan     Row Name 01/18/22 1128          Bed Mobility Goal 1 (PT)    Activity/Assistive Device (Bed Mobility Goal 1, PT) sit to supine/supine to sit  -AY     [place] Level/Cues Needed (Bed Mobility Goal 1, PT) independent  -AY     Time Frame (Bed Mobility Goal 1, PT) long term goal (LTG); 2 weeks  -AY     Progress/Outcomes (Bed Mobility Goal 1, PT) goal ongoing  -AY     Row Name 01/18/22 1128          Gait Training Goal 1 (PT)    Activity/Assistive Device (Gait Training Goal 1, PT) gait (walking locomotion)  -AY     [place] Level (Gait Training Goal 1, PT) independent  -AY     Distance (Gait Training Goal 1, PT) 250  -AY     Time Frame (Gait Training Goal 1, PT) long term goal (LTG); 2 weeks  -AY     Progress/Outcome (Gait Training Goal 1, PT) goal ongoing  -AY           User Key  (r) = Recorded By, (t) = Taken By, (c) = Cosigned By    Initials Name Provider Type    Aida Gaston, PT Physical Therapist               Clinical Impression     Row Name 01/18/22 1126          Pain    Additional Documentation Pain Scale: Numbers Pre/Post-Treatment (Group)  -AY     Row Name 01/18/22  1126          Pain Scale: Numbers Pre/Post-Treatment    Pretreatment Pain Rating 0/10 - no pain  -AY     Posttreatment Pain Rating 0/10 - no pain  -AY     Row Name 01/18/22 1126          Plan of Care Review    Plan of Care Reviewed With patient  -AY     Outcome Summary PT initial eval completed. Pt limited by decreased functional endurance, mild balance deficit, and mild weakness. Pt amb 110ft with SBA, but min A required for dynamic gait activities. O2 remained >90% on RA throughout. Rec continued skilled IP PT. d/c rec for home with assist.  -AY     Row Name 01/18/22 1126          Therapy Assessment/Plan (PT)    Rehab Potential (PT) good, to achieve stated therapy goals  -AY     Criteria for Skilled Interventions Met (PT) yes; meets criteria; skilled treatment is necessary  -AY     Row Name 01/18/22 1126          Vital Signs    Pre Systolic BP Rehab 164  -AY     Pre Treatment Diastolic BP 76  -AY     Pretreatment Heart Rate (beats/min) 59  -AY     Intratreatment Heart Rate (beats/min) 111  -AY     Posttreatment Heart Rate (beats/min) 81  -AY     Pre SpO2 (%) 94  -AY     O2 Delivery Pre Treatment room air  -AY     O2 Delivery Intra Treatment room air  -AY     Post SpO2 (%) 97  -AY     O2 Delivery Post Treatment room air  -AY     Pre Patient Position Sitting  -AY     Intra Patient Position Standing  -AY     Post Patient Position Sitting  -AY     Row Name 01/18/22 1126          Positioning and Restraints    Pre-Treatment Position sitting in chair/recliner  -AY     Post Treatment Position chair  -AY     In Chair notified nsg; reclined; sitting; call light within reach; encouraged to call for assist; exit alarm on; waffle cushion; legs elevated  -AY           User Key  (r) = Recorded By, (t) = Taken By, (c) = Cosigned By    Initials Name Provider Type    AY Aida Frankel, PT Physical Therapist               Outcome Measures     Row Name 01/18/22 1128          How much help from another person do you currently need...     Turning from your back to your side while in flat bed without using bedrails? 4  -AY     Moving from lying on back to sitting on the side of a flat bed without bedrails? 3  -AY     Moving to and from a bed to a chair (including a wheelchair)? 3  -AY     Standing up from a chair using your arms (e.g., wheelchair, bedside chair)? 3  -AY     Climbing 3-5 steps with a railing? 3  -AY     To walk in hospital room? 3  -AY     AM-PAC 6 Clicks Score (PT) 19  -AY     Row Name 01/18/22 1128 01/18/22 1014       Functional Assessment    Outcome Measure Options AM-PAC 6 Clicks Basic Mobility (PT)  -AY AM-PAC 6 Clicks Daily Activity (OT)  -KEO          User Key  (r) = Recorded By, (t) = Taken By, (c) = Cosigned By    Initials Name Provider Type    Bee Lorenzo, OT Occupational Therapist    Aida Gaston, PT Physical Therapist                             Physical Therapy Education                 Title: PT OT SLP Therapies (In Progress)     Topic: Physical Therapy (Done)     Point: Mobility training (Done)     Learning Progress Summary           Patient Acceptance, E,TB, VU,NR by AY at 1/18/2022 1128                   Point: Home exercise program (Done)     Learning Progress Summary           Patient Acceptance, E,TB, VU,NR by AY at 1/18/2022 1128                   Point: Body mechanics (Done)     Learning Progress Summary           Patient Acceptance, E,TB, VU,NR by AY at 1/18/2022 1128                   Point: Precautions (Done)     Learning Progress Summary           Patient Acceptance, E,TB, VU,NR by AY at 1/18/2022 1128                               User Key     Initials Effective Dates Name Provider Type Discipline    AY 11/10/20 -  Aida Frankel, PT Physical Therapist PT              PT Recommendation and Plan  Planned Therapy Interventions (PT): balance training, bed mobility training, gait training, home exercise program, patient/family education, strengthening, transfer training, postural re-education  Plan of  Care Reviewed With: patient  Outcome Summary: PT initial eval completed. Pt limited by decreased functional endurance, mild balance deficit, and mild weakness. Pt amb 110ft with SBA, but min A required for dynamic gait activities. O2 remained >90% on RA throughout. Rec continued skilled IP PT. d/c rec for home with assist.     Time Calculation:    PT Charges     Row Name 01/18/22 1129             Time Calculation    Start Time 0900  -AY      PT Received On 01/18/22  -AY      PT Goal Re-Cert Due Date 01/28/22  -AY              Timed Charges    26739 - Gait Training Minutes  10  -AY              Untimed Charges    PT Eval/Re-eval Minutes 46  -AY              Total Minutes    Timed Charges Total Minutes 10  -AY      Untimed Charges Total Minutes 46  -AY       Total Minutes 56  -AY            User Key  (r) = Recorded By, (t) = Taken By, (c) = Cosigned By    Initials Name Provider Type    AY Aida Frankel, KATIE Physical Therapist              Therapy Charges for Today     Code Description Service Date Service Provider Modifiers Qty    11639722599 HC GAIT TRAINING EA 15 MIN 1/18/2022 Aida Frankel, PT GP 1    80098848267 HC PT EVAL LOW COMPLEXITY 4 1/18/2022 Aida Frankel, PT GP 1          PT G-Codes  Outcome Measure Options: AM-PAC 6 Clicks Basic Mobility (PT)  AM-PAC 6 Clicks Score (PT): 19  AM-PAC 6 Clicks Score (OT): 23    Aida Frankel PT  1/18/2022

## 2022-01-18 NOTE — PLAN OF CARE
Goal Outcome Evaluation:  Plan of Care Reviewed With: patient           Outcome Summary: PT initial eval completed. Pt limited by decreased functional endurance, mild balance deficit, and mild weakness. Pt amb 110ft with SBA, but min A required for dynamic gait activities. O2 remained >90% on RA throughout. Rec continued skilled IP PT. d/c rec for home with assist.

## 2022-01-18 NOTE — PLAN OF CARE
Goal Outcome Evaluation:  Plan of Care Reviewed With: patient        Progress: no change  Outcome Summary: Patient with bradycardia again this shift. HR as low as 31 (documented) while sleeping. APRN notified. Remdesivir again D/C'd. Other VSS. continue current POC

## 2022-01-18 NOTE — DISCHARGE SUMMARY
[facility] Medicine Services  DISCHARGE SUMMARY    Patient Name: Alessio Kwon  : 1932  MRN: [id number]    Date of Admission: 2022  8:33 AM  Date of Discharge:  22  Primary Care Physician: Alice Kong MD    Consults     No orders found from 2021 to 2022.          Hospital Course     Presenting Problem:   COVID-19 [U07.1]    Active Hospital Problems    Diagnosis  POA   • Rheumatoid arthritis involving multiple sites (HCC) [M06.9]  Unknown   • Essential hypertension [I10]  Yes   • COVID-19 [U07.1]  Yes      Resolved Hospital Problems    Diagnosis Date Resolved POA   • Renal insufficiency [N28.9] 2022 Yes          Hospital Course:  Alessio Kwon is an 89-year-old female with a history of breast cancer, hypertension, rheumatoid arthritis who is admitted for acute hypoxemic respiratory failure secondary suspected to COVID-19 pneumonia.  She is vaccinated and boosted.  Symptoms first started on 1/15/2022     Acute hypoxemic respiratory failure  COVID-19 pneumonia  - Vaccinated and boosted  - Symptoms 1/15; positive ; isolation through 2/3   - CTA with no PE; minimal peribronchial tree in bud nodularity likely minimal chronic or atypical PNA   - patient completed 3 doses of dexamethasone. Will discharge to complete 10 days total. She did receive 2 doses of remdesivir, however, discontinued due to bradycardia. She was continued on lovenox dvt ppx and CTA and duplex negative for clot. She was weaned to room air. Close follow up with PCP.      Elevation in creatinine  - Cr improved with fluids     Hypertension  -home meds amlodipine and lisinopril continued  - BP controlled     RA  - Holding home rinvoq until completes treatment for COVID. Can resume home prednisone after completion.     Discharge Follow Up Recommendations for outpatient labs/diagnostics:   PCP Dr. Kong 1 week - telemedicine     Day of Discharge     HPI:   No acute events. Some  cough but improved. States she has been up and feels better today. On room air. Reviewed plans for discharge and she is agreeable and feels comfortable with the plan. Updated daughter and agreeable with discharge today.     Review of Systems  Gen- No fevers, chills  CV- No chest pain, palpitations  Resp- + cough, dyspnea  GI- No N/V/D, abd pain    Vital Signs:   Temp:  [97 °F (36.1 °C)-97.5 °F (36.4 °C)] 97.1 °F (36.2 °C)  Heart Rate:  [31-68] 60  Resp:  [18] 18  BP: (122-164)/(66-89) 133/71  Flow (L/min):  [2] 2      Physical Exam:  Constitutional: No acute distress, awake, alert  HENT: NCAT, mucous membranes moist  Respiratory: Clear to auscultation bilaterally, respiratory effort normal; occ cough   Cardiovascular: RRR, no murmurs, rubs, or gallops  Gastrointestinal: Positive bowel sounds, soft, nontender, nondistended  Musculoskeletal: No bilateral ankle edema  Psychiatric: Appropriate affect, cooperative  Neurologic: Oriented x 3, strength symmetric in all extremities, Cranial Nerves grossly intact to confrontation, speech clear  Skin: No rashes    Pertinent  and/or Most Recent Results     LAB RESULTS:      Lab 01/18/22  0604 01/17/22  0636 01/16/22  0900   WBC 4.99 4.09 5.66   HEMOGLOBIN 11.3* 10.8* 12.3   HEMATOCRIT 33.7* 32.7* 36.3   PLATELETS 161 150 160   NEUTROS ABS 3.27 2.61 4.22   IMMATURE GRANS (ABS) 0.01 0.02 0.03   LYMPHS ABS 1.17 0.80 0.73   MONOS ABS 0.53 0.65 0.64   EOS ABS 0.00 0.00 0.02   MCV 87.3 90.1 87.7   CRP 0.32 0.54* 0.34   PROCALCITONIN  --   --  0.06   LACTATE  --   --  0.9   LDH  --   --  222*   D DIMER QUANT  --   --  1.94*         Lab 01/18/22  0604 01/17/22  0636 01/16/22  1234 01/16/22  0900   SODIUM 137 134*  --  134*   POTASSIUM 3.8 3.7  --  4.2   CHLORIDE 102 101  --  97*   CO2 24.0 23.0  --  23.0   ANION GAP 11.0 10.0  --  14.0   BUN 24* 22  --  22   CREATININE 1.02* 0.98 1.04* 1.04*   GLUCOSE 83 81  --  90   CALCIUM 9.6 9.5  --  9.9         Lab 01/18/22  0604 01/17/22  0636  01/16/22  0900   TOTAL PROTEIN 6.0 5.7* 6.4  6.4   ALBUMIN 3.80 3.60 4.30  4.30   GLOBULIN 2.2 2.1 2.1   ALT (SGPT) 12 11 12  12   AST (SGOT) 23 20 27  26   BILIRUBIN 0.2 <0.2 0.3  0.4   BILIRUBIN DIRECT  --  <0.2 <0.2   ALK PHOS 56 54 69  69         Lab 01/16/22  0928 01/16/22  0900   PROBNP  --  443.5   TROPONIN T <0.010  --              Lab 01/18/22  0604   FERRITIN 94.62         Brief Urine Lab Results     None        Microbiology Results (last 10 days)     Procedure Component Value - Date/Time    Blood Culture - Blood, Arm, Left [[id number]]  (Normal) Collected: 01/16/22 1000    Lab Status: Preliminary result Specimen: Blood from Arm, Left Updated: 01/18/22 1015     Blood Culture No growth at 2 days    Blood Culture - Blood, Arm, Right [[id number]]  (Normal) Collected: 01/16/22 0955    Lab Status: Preliminary result Specimen: Blood from Arm, Right Updated: 01/18/22 1015     Blood Culture No growth at 2 days    Respiratory Panel PCR w/COVID-19(SARS-CoV-2) [facility] In-House, NP Swab in UTM/VTM, 3-4 HR TAT - Swab, Nasopharynx [[id number]]  (Abnormal) Collected: 01/16/22 0855    Lab Status: Final result Specimen: Swab from Nasopharynx Updated: 01/16/22 1009     ADENOVIRUS, PCR Not Detected     Coronavirus 229E Not Detected     Coronavirus HKU1 Not Detected     Coronavirus NL63 Not Detected     Coronavirus OC43 Not Detected     COVID19 Detected     Human Metapneumovirus Not Detected     Human Rhinovirus/Enterovirus Not Detected     Influenza A PCR Not Detected     Influenza B PCR Not Detected     Parainfluenza Virus 1 Not Detected     Parainfluenza Virus 2 Not Detected     Parainfluenza Virus 3 Not Detected     Parainfluenza Virus 4 Not Detected     RSV, PCR Not Detected     Bordetella pertussis pcr Not Detected     Bordetella parapertussis PCR Not Detected     Chlamydophila pneumoniae PCR Not Detected     Mycoplasma pneumo by PCR Not Detected    Narrative:      In the setting of a positive  respiratory panel with a viral infection PLUS a negative procalcitonin without other underlying concern for bacterial infection, consider observing off antibiotics or discontinuation of antibiotics and continue supportive care. If the respiratory panel is positive for atypical bacterial infection (Bordetella pertussis, Chlamydophila pneumoniae, or Mycoplasma pneumoniae), consider antibiotic de-escalation to target atypical bacterial infection.          XR Chest 1 View    Result Date: 1/16/2022  EXAMINATION: XR CHEST 1 VW-  INDICATION: SOA triage protocol  COMPARISON: 9/25/2017  FINDINGS: Mild chronic changes of the lung fields are present without focal airspace opacity. There is no significant pleural effusion or distinct pneumothorax. Normal heart and mediastinal contours.      Mild chronic changes of the lung fields without evidence of acute cardiopulmonary abnormality.  This report was finalized on 1/16/2022 9:26 AM by Jay Bautista.      Duplex Venous Lower Extremity - Bilateral CAR    Result Date: 1/17/2022  · Normal bilateral lower extremity venous duplex scan.      CT Angiogram Chest    Result Date: 1/16/2022  EXAMINATION: CT ANGIOGRAM CHEST-  INDICATION: covid, yung, + d dimer; U07.1-COVID-19; R09.02-Hypoxemia; R79.89-Other specified abnormal findings of blood chemistry; E87.2-Nfpa-oqkzgfiyjq and hyponatremia  TECHNIQUE: Axial pulmonary arterial phase IV contrast enhanced CT angiogram of the chest per PE protocol. Two-dimensional reconstructions were postprocessed.  The radiation dose reduction device was turned on for each scan per the ALARA (As Low as Reasonably Achievable) protocol.  COMPARISON: 9/28/2017  FINDINGS: No pathologic axillary adenopathy or other worrisome body wall soft tissue finding in the chest. No acute findings in the partially imaged upper abdomen. No pleural or pericardial effusion. No pathologic mediastinal or hilar lymphadenopathy. Evaluation of the osseous structures demonstrates  no evidence of acute fracture or aggressive osseous lesion. Atherosclerotic nonaneurysmal thoracic aorta. Evaluation of the pulmonary arteries demonstrates no evidence of filling defect concerning for acute pulmonary embolus. Evaluation of the lung fields demonstrates some minimal peribronchial tree-in-bud nodularity in the lingula and right upper lobe, likely minimal chronic or atypical pneumonia. There is otherwise no evidence of acute infectious or inflammatory process.      No pulmonary embolus.  Evaluation of the lung fields demonstrates some minimal peribronchial tree-in-bud nodularity in the lingula and right upper lobe, likely minimal chronic or atypical pneumonia. There is otherwise no evidence of acute infectious or inflammatory process.   This report was finalized on 1/16/2022 11:05 AM by Jay Bautista.        Results for orders placed during the hospital encounter of 01/16/22    Duplex Venous Lower Extremity - Bilateral CAR    Interpretation Summary  · Normal bilateral lower extremity venous duplex scan.      Results for orders placed during the hospital encounter of 01/16/22    Duplex Venous Lower Extremity - Bilateral CAR    Interpretation Summary  · Normal bilateral lower extremity venous duplex scan.          Plan for Follow-up of Pending Labs/Results:   Pending Labs     Order Current Status    Blood Culture - Blood, Arm, Left Preliminary result    Blood Culture - Blood, Arm, Right Preliminary result        Discharge Details        Discharge Medications      New Medications      Instructions Start Date   benzonatate 100 MG capsule  Commonly known as: TESSALON   100 mg, Oral, 3 Times Daily PRN      dexamethasone 6 MG tablet  Commonly known as: DECADRON   6 mg, Oral, Daily   Start Date: January 19, 2022        Changes to Medications      Instructions Start Date   predniSONE 2.5 MG tablet  Commonly known as: DELTASONE  What changed: additional instructions   2.5 mg, Oral, Daily, Resume once completed  dexamethasone      Rinvoq 15 MG tablet sustained-release 24 hour  Generic drug: Upadacitinib ER  What changed: See the new instructions.   15 mg, Oral, Daily, Recommend restarting after completion of treatment for COVID and with guidance of your primary physician         Continue These Medications      Instructions Start Date   amLODIPine 5 MG tablet  Commonly known as: NORVASC   amlodipine 5 mg tablet      anastrozole 1 MG tablet  Commonly known as: ARIMIDEX   1 mg, Oral, Daily      docusate sodium 250 MG capsule  Commonly known as: COLACE   250 mg, Oral, Daily PRN      folic acid 1 MG tablet  Commonly known as: FOLVITE   1 mg, Oral, Daily      lisinopril 20 MG tablet  Commonly known as: PRINIVIL,ZESTRIL   20 mg, Oral, Daily      multivitamin with minerals tablet tablet   multivitamin      naproxen sodium 220 MG tablet  Commonly known as: ALEVE   220 mg, Oral, 2 Times Daily PRN      VITAMIN B-1 PO   1 tablet, Oral      Vitamin D-3 25 MCG (1000 UT) capsule   1,000 Units, Oral, Daily             No Known Allergies      Discharge Disposition:  Home or Self Care    Diet:  Hospital:  Diet Order   Procedures   • Diet Regular       Activity:  Activity Instructions     Activity as Tolerated            Restrictions or Other Recommendations:         CODE STATUS:    Code Status and Medical Interventions:   Ordered at: 01/16/22 1314     Level Of Support Discussed With:    Patient    Next of Kin (If No Surrogate)     Code Status (Patient has no pulse and is not breathing):    No CPR (Do Not Attempt to Resuscitate)     Medical Interventions (Patient has pulse or is breathing):    Full Support       No future appointments.    Additional Instructions for the Follow-ups that You Need to Schedule     Discharge Follow-up with PCP   As directed       Currently Documented PCP:    Alice Kong MD    PCP Phone Number:    [phone]     Follow Up Details: PCP Dr. Kong 1 week telemedicine                     Negra Bailey  DO  01/18/22      Time Spent on Discharge:  I spent  35  minutes on this discharge activity which included: face-to-face encounter with the patient, reviewing the data in the system, coordination of the care with the nursing staff as well as consultants, documentation, and entering orders.

## 2022-01-19 ENCOUNTER — READMISSION MANAGEMENT (OUTPATIENT)
Dept: CALL CENTER | Facility: HOSPITAL | Age: 87
End: 2022-01-19

## 2022-01-19 NOTE — OUTREACH NOTE
Prep Survey      Responses   [facility] patient discharged from? [place]   Is LACE score < 7 ? No   Emergency Room discharge w/ pulse ox? No   Eligibility Readm Mgmt   Discharge diagnosis COVID19   Does the patient have one of the following disease processes/diagnoses(primary or secondary)? COVID-19   Does the patient have Home health ordered? No   Is there a DME ordered? No   Prep survey completed? Yes          Christie Horne RN

## 2022-01-19 NOTE — OUTREACH NOTE
COVID-19 Week 1 Survey      Responses   [facility] patient discharged from? [place]   Does the patient have one of the following disease processes/diagnoses(primary or secondary)? COVID-19   COVID-19 underlying condition? None   Call Number Call 1   Week 1 Call successful? Yes   Call start time 1208   Call end time 1220   Discharge diagnosis COVID19   Is patient permission given to speak with other caregiver? No   Meds reviewed with patient/caregiver? --  [new dexamethasone, benzonatate]   Does the patient have all medications ordered at discharge? Yes   Is the patient taking all medications as directed (includes completed medication regime)? Yes   Does the patient have a primary care provider?  Yes   Comments regarding PCP Alice Kong MD PCP. Recommend one week telemedicine appt.    Does the patient have an appointment with their PCP or specialist within 7 days of discharge? No   What is preventing the patient from scheduling follow up appointments within 7 days of discharge? --  [Patient reports that her daughter is a pulmonologist, son a family Dr. ]   Has the patient kept scheduled appointments due by today? N/A   Has home health visited the patient within 72 hours of discharge? N/A   Psychosocial issues? No   Did the patient receive a copy of their discharge instructions? Yes   Did the patient receive a copy of COVID-19 specific instructions? Yes   Nursing interventions Reviewed instructions with patient   What is the patient's perception of their health status since discharge? Improving   Does the patient have any of the following symptoms? Cough   Nursing Interventions Nurse provided patient education   Pulse Ox monitoring Intermittent   Pulse Ox device source Patient   O2 Sat comments 97% on room air   O2 Sat: education provided Sat levels,  Monitoring frequency,  When to seek care   Is the patient/caregiver able to teach back steps to recovery at home? Set small, achievable goals for return to  baseline health,  Rest and rebuild strength, gradually increase activity,  Eat a well-balance diet   If the patient is a current smoker, are they able to teach back resources for cessation? Not a smoker   Is the patient/caregiver able to teach back the hierarchy of who to call/visit for symptoms/problems? PCP, Specialist, Home health nurse, Urgent Care, ED, 911 Yes   COVID-19 call completed? Yes   Wrap up additional comments Per AVS instruction, patient to home quarantine thru 2/3/22. Patient reports that she is isolated from other family members in home. Patient reports that she is doing well. Denies any further questions or needs today.           Luly Reddy RN

## 2022-01-20 ENCOUNTER — READMISSION MANAGEMENT (OUTPATIENT)
Dept: CALL CENTER | Facility: HOSPITAL | Age: 87
End: 2022-01-20

## 2022-01-20 NOTE — OUTREACH NOTE
"COVID-19 Week 1 Survey      Responses   [facility] patient discharged from? [place]   Does the patient have one of the following disease processes/diagnoses(primary or secondary)? COVID-19   COVID-19 underlying condition? None   Call Number Call 2   Week 1 Call successful? Yes   Call start time 1440   Call end time 1447   Discharge diagnosis COVID19   Meds reviewed with patient/caregiver? Yes   Is the patient having any side effects they believe may be caused by any medication additions or changes? No   Is the patient taking all medications as directed (includes completed medication regime)? Yes   Does the patient have a primary care provider?  Yes   Comments regarding PCP Patient to call for appt. for telehealth,  Daughter is pulmonologist who is \"keeping an eye on me\"   Does the patient have an appointment with their PCP or specialist within 7 days of discharge? Yes   Has the patient kept scheduled appointments due by today? N/A   Has home health visited the patient within 72 hours of discharge? N/A   What is the patient's perception of their health status since discharge? Improving   Does the patient have any of the following symptoms? Cough  [Cough is improving]   Nursing Interventions Nurse provided patient education   Pulse Ox monitoring Intermittent   Pulse Ox device source Patient   O2 Sat comments 97% on RA   O2 Sat: education provided Sat levels,  Monitoring frequency,  When to seek care   Is the patient/caregiver able to teach back steps to recovery at home? Set small, achievable goals for return to baseline health,  Rest and rebuild strength, gradually increase activity,  Eat a well-balance diet  [Eating and drinking well]   If the patient is a current smoker, are they able to teach back resources for cessation? Not a smoker   Is the patient/caregiver able to teach back the hierarchy of who to call/visit for symptoms/problems? PCP, Specialist, Home health nurse, Urgent Care, ED, 911 Yes "   COVID-19 call completed? Yes   Wrap up additional comments Improving. Utilizing IS. Patient states daughter is pulmonologist.          Bria Garcia RN

## 2022-01-21 ENCOUNTER — READMISSION MANAGEMENT (OUTPATIENT)
Dept: CALL CENTER | Facility: HOSPITAL | Age: 87
End: 2022-01-21

## 2022-01-21 LAB
BACTERIA SPEC AEROBE CULT: NORMAL
BACTERIA SPEC AEROBE CULT: NORMAL

## 2022-01-21 NOTE — OUTREACH NOTE
COVID-19 Week 1 Survey      Responses   [facility] patient discharged from? [place]   Does the patient have one of the following disease processes/diagnoses(primary or secondary)? COVID-19   COVID-19 underlying condition? None   Call Number Call 3   Week 1 Call successful? Yes   Call start time 1301   Call end time 1308   Discharge diagnosis COVID19   Meds reviewed with patient/caregiver? Yes   Is the patient having any side effects they believe may be caused by any medication additions or changes? No   Does the patient have all medications ordered at discharge? Yes   Is the patient taking all medications as directed (includes completed medication regime)? Yes   Does the patient have a primary care provider?  Yes   Has the patient kept scheduled appointments due by today? N/A   Has home health visited the patient within 72 hours of discharge? N/A   Psychosocial issues? No   Did the patient receive a copy of their discharge instructions? Yes   Did the patient receive a copy of COVID-19 specific instructions? Yes   Nursing interventions Reviewed instructions with patient   What is the patient's perception of their health status since discharge? Improving  [Pt w/continued improvement, intermittent cough at times. Sats in mid to upper 90's]   Does the patient have any of the following symptoms? Cough   Nursing Interventions Nurse provided patient education   Pulse Ox monitoring Intermittent   Pulse Ox device source Patient   O2 Sat comments 97% on RA   O2 Sat: education provided Sat levels,  Monitoring frequency,  When to seek care   Is the patient/caregiver able to teach back steps to recovery at home? Set small, achievable goals for return to baseline health,  Rest and rebuild strength, gradually increase activity,  Eat a well-balance diet   If the patient is a current smoker, are they able to teach back resources for cessation? Not a smoker   Is the patient/caregiver able to teach back the hierarchy of who to  call/visit for symptoms/problems? PCP, Specialist, Home health nurse, Urgent Care, ED, 911 Yes   COVID-19 call completed? Yes   Wrap up additional comments Pt doing well--was able to fix her own breakfast this morning, get dressed on her own and even did some laundry.  Pt resting at time of call with her dog Geoffrey.  Pt only c/o is cough at times.-          Skylar Campbell RN

## 2022-01-25 ENCOUNTER — READMISSION MANAGEMENT (OUTPATIENT)
Dept: CALL CENTER | Facility: HOSPITAL | Age: 87
End: 2022-01-25

## 2022-01-25 NOTE — OUTREACH NOTE
COVID-19 Week 2 Survey      Responses   [facility] patient discharged from? [place]   Does the patient have one of the following disease processes/diagnoses(primary or secondary)? COVID-19   COVID-19 underlying condition? None   Call Number Call 1   COVID-19 Week 2: Call 1 attempt successful? Yes   Call start time 1252   Call end time 1256   Discharge diagnosis COVID19   Has home health visited the patient within 72 hours of discharge? N/A   Psychosocial issues? No   What is the patient's perception of their health status since discharge? Improving   Does the patient have any of the following symptoms? Cough  [Fatigue]   Nursing Interventions Nurse provided patient education   Pulse Ox monitoring Intermittent   Pulse Ox device source Patient   O2 Sat comments 97% on RA   Is the patient/caregiver able to teach back the hierarchy of who to call/visit for symptoms/problems? PCP, Specialist, Home health nurse, Urgent Care, ED, 911 Yes   COVID-19 call completed? Yes   Wrap up additional comments Pt doing better. Lives with two physicians so feels like she is well cared for.           Sabiha Hernandez RN

## 2023-04-13 ENCOUNTER — HOSPITAL ENCOUNTER (OUTPATIENT)
Dept: CT IMAGING | Facility: HOSPITAL | Age: 88
Discharge: HOME | End: 2023-04-13
Admitting: STUDENT IN AN ORGANIZED HEALTH CARE EDUCATION/TRAINING PROGRAM
Payer: MEDICARE

## 2023-04-13 ENCOUNTER — TRANSCRIBE ORDERS (OUTPATIENT)
Dept: ADMINISTRATIVE | Facility: HOSPITAL | Age: 88
End: 2023-04-13
Payer: MEDICARE

## 2023-04-13 DIAGNOSIS — G45.9 TIA (TRANSIENT ISCHEMIC ATTACK): ICD-10-CM

## 2023-04-13 DIAGNOSIS — G45.9 TRANSIENT ISCHEMIC ATTACK: ICD-10-CM

## 2023-04-13 DIAGNOSIS — G45.9 TIA (TRANSIENT ISCHEMIC ATTACK): Primary | ICD-10-CM

## 2023-04-13 DIAGNOSIS — G45.9 TRANSIENT ISCHEMIC ATTACK: Primary | ICD-10-CM

## 2023-04-13 PROCEDURE — 25510000001 IOPAMIDOL PER 1 ML: Performed by: STUDENT IN AN ORGANIZED HEALTH CARE EDUCATION/TRAINING PROGRAM

## 2023-04-13 PROCEDURE — 70498 CT ANGIOGRAPHY NECK: CPT

## 2023-04-13 PROCEDURE — 70496 CT ANGIOGRAPHY HEAD: CPT

## 2023-04-13 RX ADMIN — IOPAMIDOL 75 ML: 755 INJECTION, SOLUTION INTRAVENOUS at 16:34

## 2023-05-13 LAB — CREAT BLDA-MCNC: 1.2 MG/DL (ref 0.6–1.3)

## 2023-06-02 ENCOUNTER — APPOINTMENT (OUTPATIENT)
Dept: GENERAL RADIOLOGY | Facility: HOSPITAL | Age: 88
End: 2023-06-02
Payer: MEDICARE

## 2023-06-02 ENCOUNTER — APPOINTMENT (OUTPATIENT)
Dept: CT IMAGING | Facility: HOSPITAL | Age: 88
End: 2023-06-02
Payer: MEDICARE

## 2023-06-02 ENCOUNTER — HOSPITAL ENCOUNTER (EMERGENCY)
Facility: HOSPITAL | Age: 88
Discharge: HOME | End: 2023-06-02
Attending: EMERGENCY MEDICINE
Payer: MEDICARE

## 2023-06-02 VITALS
OXYGEN SATURATION: 95 % | HEIGHT: 66 IN | WEIGHT: 123 LBS | DIASTOLIC BLOOD PRESSURE: 49 MMHG | TEMPERATURE: 98.3 F | RESPIRATION RATE: 16 BRPM | BODY MASS INDEX: 19.77 KG/M2 | SYSTOLIC BLOOD PRESSURE: 110 MMHG | HEART RATE: 65 BPM

## 2023-06-02 DIAGNOSIS — R05.2 SUBACUTE COUGH: Primary | ICD-10-CM

## 2023-06-02 DIAGNOSIS — E86.0 MILD DEHYDRATION: ICD-10-CM

## 2023-06-02 DIAGNOSIS — R07.89 ATYPICAL CHEST PAIN: ICD-10-CM

## 2023-06-02 DIAGNOSIS — J18.9 PNEUMONIA OF BOTH LOWER LOBES DUE TO INFECTIOUS ORGANISM: ICD-10-CM

## 2023-06-02 LAB
ALBUMIN SERPL-MCNC: 3.8 G/DL (ref 3.5–5.2)
ALBUMIN/GLOB SERPL: 1.2 G/DL
ALP SERPL-CCNC: 62 U/L (ref 39–117)
ALT SERPL W P-5'-P-CCNC: 11 U/L (ref 1–33)
ANION GAP SERPL CALCULATED.3IONS-SCNC: 7 MMOL/L (ref 5–15)
AST SERPL-CCNC: 19 U/L (ref 1–32)
BASOPHILS # BLD AUTO: 0.03 10*3/MM3 (ref 0–0.2)
BASOPHILS NFR BLD AUTO: 0.3 % (ref 0–1.5)
BILIRUB SERPL-MCNC: 0.6 MG/DL (ref 0–1.2)
BUN SERPL-MCNC: 20 MG/DL (ref 8–23)
BUN/CREAT SERPL: 21.7 (ref 7–25)
CALCIUM SPEC-SCNC: 9.4 MG/DL (ref 8.2–9.6)
CHLORIDE SERPL-SCNC: 99 MMOL/L (ref 98–107)
CO2 SERPL-SCNC: 23 MMOL/L (ref 22–29)
CREAT SERPL-MCNC: 0.92 MG/DL (ref 0.57–1)
D-LACTATE SERPL-SCNC: 0.8 MMOL/L (ref 0.5–2)
D-LACTATE SERPL-SCNC: 1 MMOL/L (ref 0.5–2)
DEPRECATED RDW RBC AUTO: 40.7 FL (ref 37–54)
EGFRCR SERPLBLD CKD-EPI 2021: 59.3 ML/MIN/1.73
EOSINOPHIL # BLD AUTO: 0.05 10*3/MM3 (ref 0–0.4)
EOSINOPHIL NFR BLD AUTO: 0.5 % (ref 0.3–6.2)
ERYTHROCYTE [DISTWIDTH] IN BLOOD BY AUTOMATED COUNT: 13.3 % (ref 12.3–15.4)
GEN 5 2HR TROPONIN T REFLEX: 17 NG/L
GLOBULIN UR ELPH-MCNC: 3.1 GM/DL
GLUCOSE SERPL-MCNC: 109 MG/DL (ref 65–99)
HCT VFR BLD AUTO: 33.9 % (ref 34–46.6)
HGB BLD-MCNC: 11.2 G/DL (ref 12–15.9)
HOLD SPECIMEN: NORMAL
IMM GRANULOCYTES # BLD AUTO: 0.06 10*3/MM3 (ref 0–0.05)
IMM GRANULOCYTES NFR BLD AUTO: 0.6 % (ref 0–0.5)
LIPASE SERPL-CCNC: 26 U/L (ref 13–60)
LYMPHOCYTES # BLD AUTO: 1.08 10*3/MM3 (ref 0.7–3.1)
LYMPHOCYTES NFR BLD AUTO: 10.6 % (ref 19.6–45.3)
MCH RBC QN AUTO: 27.7 PG (ref 26.6–33)
MCHC RBC AUTO-ENTMCNC: 33 G/DL (ref 31.5–35.7)
MCV RBC AUTO: 83.9 FL (ref 79–97)
MONOCYTES # BLD AUTO: 0.91 10*3/MM3 (ref 0.1–0.9)
MONOCYTES NFR BLD AUTO: 8.9 % (ref 5–12)
NEUTROPHILS NFR BLD AUTO: 79.1 % (ref 42.7–76)
NEUTROPHILS NFR BLD AUTO: 8.09 10*3/MM3 (ref 1.7–7)
NRBC BLD AUTO-RTO: 0 /100 WBC (ref 0–0.2)
NT-PROBNP SERPL-MCNC: 347.3 PG/ML (ref 0–1800)
PLATELET # BLD AUTO: 241 10*3/MM3 (ref 140–450)
PMV BLD AUTO: 8.1 FL (ref 6–12)
POTASSIUM SERPL-SCNC: 4.8 MMOL/L (ref 3.5–5.2)
PROT SERPL-MCNC: 6.9 G/DL (ref 6–8.5)
QT INTERVAL: 352 MS
QT INTERVAL: 394 MS
QTC INTERVAL: 418 MS
QTC INTERVAL: 425 MS
RBC # BLD AUTO: 4.04 10*6/MM3 (ref 3.77–5.28)
SODIUM SERPL-SCNC: 129 MMOL/L (ref 136–145)
TROPONIN T DELTA: -3 NG/L
TROPONIN T SERPL HS-MCNC: 20 NG/L
WBC NRBC COR # BLD: 10.22 10*3/MM3 (ref 3.4–10.8)
WHOLE BLOOD HOLD COAG: NORMAL
WHOLE BLOOD HOLD SPECIMEN: NORMAL

## 2023-06-02 PROCEDURE — 25510000001 IOPAMIDOL PER 1 ML: Performed by: EMERGENCY MEDICINE

## 2023-06-02 PROCEDURE — 83690 ASSAY OF LIPASE: CPT | Performed by: EMERGENCY MEDICINE

## 2023-06-02 PROCEDURE — 36415 COLL VENOUS BLD VENIPUNCTURE: CPT

## 2023-06-02 PROCEDURE — 25010000002 CEFTRIAXONE PER 250 MG: Performed by: EMERGENCY MEDICINE

## 2023-06-02 PROCEDURE — 96361 HYDRATE IV INFUSION ADD-ON: CPT

## 2023-06-02 PROCEDURE — 85025 COMPLETE CBC W/AUTO DIFF WBC: CPT | Performed by: EMERGENCY MEDICINE

## 2023-06-02 PROCEDURE — 93005 ELECTROCARDIOGRAM TRACING: CPT | Performed by: EMERGENCY MEDICINE

## 2023-06-02 PROCEDURE — 93005 ELECTROCARDIOGRAM TRACING: CPT

## 2023-06-02 PROCEDURE — 83880 ASSAY OF NATRIURETIC PEPTIDE: CPT | Performed by: EMERGENCY MEDICINE

## 2023-06-02 PROCEDURE — 87040 BLOOD CULTURE FOR BACTERIA: CPT | Performed by: EMERGENCY MEDICINE

## 2023-06-02 PROCEDURE — 80053 COMPREHEN METABOLIC PANEL: CPT | Performed by: EMERGENCY MEDICINE

## 2023-06-02 PROCEDURE — 71045 X-RAY EXAM CHEST 1 VIEW: CPT

## 2023-06-02 PROCEDURE — 83605 ASSAY OF LACTIC ACID: CPT | Performed by: EMERGENCY MEDICINE

## 2023-06-02 PROCEDURE — 99284 EMERGENCY DEPT VISIT MOD MDM: CPT

## 2023-06-02 PROCEDURE — 84484 ASSAY OF TROPONIN QUANT: CPT | Performed by: EMERGENCY MEDICINE

## 2023-06-02 PROCEDURE — 96365 THER/PROPH/DIAG IV INF INIT: CPT

## 2023-06-02 PROCEDURE — 71275 CT ANGIOGRAPHY CHEST: CPT

## 2023-06-02 RX ORDER — ASPIRIN 81 MG/1
324 TABLET, CHEWABLE ORAL ONCE
Status: COMPLETED | OUTPATIENT
Start: 2023-06-02 | End: 2023-06-02

## 2023-06-02 RX ORDER — SODIUM CHLORIDE 0.9 % (FLUSH) 0.9 %
10 SYRINGE (ML) INJECTION AS NEEDED
Status: DISCONTINUED | OUTPATIENT
Start: 2023-06-02 | End: 2023-06-02 | Stop reason: HOSPADM

## 2023-06-02 RX ORDER — SODIUM CHLORIDE 9 MG/ML
250 INJECTION, SOLUTION INTRAVENOUS CONTINUOUS
Status: DISCONTINUED | OUTPATIENT
Start: 2023-06-02 | End: 2023-06-02 | Stop reason: HOSPADM

## 2023-06-02 RX ORDER — CEFDINIR 300 MG/1
300 CAPSULE ORAL 2 TIMES DAILY
Qty: 14 CAPSULE | Refills: 0 | Status: SHIPPED | OUTPATIENT
Start: 2023-06-02

## 2023-06-02 RX ADMIN — SODIUM CHLORIDE 1 G: 900 INJECTION INTRAVENOUS at 08:27

## 2023-06-02 RX ADMIN — IOPAMIDOL 70 ML: 755 INJECTION, SOLUTION INTRAVENOUS at 08:55

## 2023-06-02 RX ADMIN — SODIUM CHLORIDE 250 ML/HR: 9 INJECTION, SOLUTION INTRAVENOUS at 08:27

## 2023-06-02 RX ADMIN — ASPIRIN 81 MG 324 MG: 81 TABLET ORAL at 06:45

## 2023-06-02 NOTE — DISCHARGE INSTRUCTIONS
Follow-up with your PCP in the next week.    Take the antibiotics prescribed as instructed twice a day over the next 7 days.    Maintain good fluid intake.    Return to Emergency Department immediately for new or change concerns.    Please review the medications you are supposed to be taking according to prior physician recommendations. I have not changed your home medications during this visit. If your discharge instructions indicate that I have changed your home medications, this is not the case, and you should disregard. If you have any questions about the medication you should be taking at home, please call your physician.

## 2023-06-02 NOTE — ED PROVIDER NOTES
Subjective   History of Present Illness  This patient is a very nice 89-year-old  female who appears somewhat younger than her stated age.  She comes in with left-sided chest pain for approximately 12 hours.  She has a history of rheumatoid arthritis, on Plaquenil, history of breast cancer and hypertension.  Patient tells me she ate dinner last night and immediately thought she was developing indigestion.  She tells me she has had a constant left-sided chest pain since dinner last night.  She tells me she was unable to sleep last night secondary to the pain.  She tells me she does not want to take a deep breath because it hurts worse when she takes a deep breath.  She has no history of DVT or PE.  No history of angina or provocative cardiac testing.  Patient reports near constant cough over the last 2 years since the diagnosis of COVID.  She reports no production of the cough.  She does report that she has had recent fevers and chills.  She reports no radiation of the pain.  Specifically, she denies to me any left arm pain or left jaw pain.  She denies any active shortness of breath.  She is in good spirits.  She comes in accompanied by her daughter, who is a clinical colleague of mine here.  Her daughter indicates that the patient is at baseline with regard to mental status.  Patient denies any dizziness, syncope, lightheadedness, or palpitations.    Past medical history  Breast cancer, rheumatoid arthritis, hypertension.  Negative for CAD.  Negative for CVA    Family history  Negative for breast cancer in first-degree family member        Review of Systems   Constitutional: Positive for chills and fever. Negative for fatigue and unexpected weight change.   HENT: Negative for dental problem, ear pain, hearing loss and sinus pressure.    Eyes: Negative for pain and visual disturbance.   Respiratory: Positive for cough. Negative for chest tightness and shortness of breath.    Cardiovascular: Positive for  chest pain. Negative for palpitations and leg swelling.   Gastrointestinal: Negative for blood in stool, diarrhea, nausea and vomiting.   Genitourinary: Negative for difficulty urinating, dysuria, frequency, hematuria and urgency.   Musculoskeletal: Negative for myalgias, neck pain and neck stiffness.   Neurological: Negative for seizures, syncope, speech difficulty, light-headedness and headaches.   Psychiatric/Behavioral: Negative for confusion.   All other systems reviewed and are negative.      Past Medical History:   Diagnosis Date   • Arthritis    • Breast cancer 2016    left   • Cancer     skin cancer   • Hx of radiation therapy 2016    left breast   • Hypertension    • RA (rheumatoid arthritis)        No Known Allergies    Past Surgical History:   Procedure Laterality Date   • BREAST BIOPSY Left 06/10/2016    US bx   • BREAST CYST EXCISION Right 1950's   • BREAST LUMPECTOMY Left 06/22/2016       Family History   Problem Relation Age of Onset   • Breast cancer Neg Hx    • Ovarian cancer Neg Hx    • Endometrial cancer Neg Hx        Social History     Socioeconomic History   • Marital status:    Tobacco Use   • Smoking status: Never   • Smokeless tobacco: Never   Substance and Sexual Activity   • Alcohol use: No   • Drug use: No   • Sexual activity: Defer           Objective   Physical Exam  Vitals and nursing note reviewed.   Constitutional:       General: She is not in acute distress.     Appearance: She is well-developed. She is not toxic-appearing.   HENT:      Head: Normocephalic and atraumatic.      Jaw: No trismus.      Right Ear: Ear canal and external ear normal.      Left Ear: Ear canal and external ear normal.      Nose: Nose normal.      Mouth/Throat:      Mouth: Mucous membranes are dry. No oral lesions.      Dentition: No dental abscesses.      Pharynx: Oropharynx is clear. No posterior oropharyngeal erythema or uvula swelling.      Tonsils: No tonsillar exudate or tonsillar abscesses.    Eyes:      Extraocular Movements: Extraocular movements intact.      Conjunctiva/sclera: Conjunctivae normal.      Right eye: Right conjunctiva is not injected.      Left eye: Left conjunctiva is not injected.      Pupils: Pupils are equal, round, and reactive to light.   Neck:      Vascular: No JVD.      Trachea: No tracheal tenderness.   Cardiovascular:      Rate and Rhythm: Normal rate and regular rhythm.      Heart sounds: Normal heart sounds.     No friction rub. No gallop.   Pulmonary:      Effort: Pulmonary effort is normal.      Breath sounds: Rhonchi present. No wheezing or rales.   Chest:      Chest wall: No tenderness.   Abdominal:      General: Bowel sounds are normal. There is no distension.      Palpations: Abdomen is soft. Abdomen is not rigid. There is no mass or pulsatile mass.      Tenderness: There is no abdominal tenderness. There is no guarding or rebound. Negative signs include McBurney's sign.      Comments: No signs of acute abdomen.  No pain at McBurney's point.  No pulsatile abdominal mass.   Musculoskeletal:         General: No tenderness or deformity. Normal range of motion.      Cervical back: Normal range of motion and neck supple. No rigidity. Normal range of motion.   Lymphadenopathy:      Cervical: No cervical adenopathy.   Skin:     General: Skin is warm and dry.      Findings: No erythema or rash.      Comments: No diaphoresis, lesions, nevi, petechia, purpura   Neurological:      Mental Status: She is alert and oriented to person, place, and time.      Cranial Nerves: No cranial nerve deficit.      Sensory: No sensory deficit.      Motor: No tremor or abnormal muscle tone.      Comments: 5/5 strength bilaterally with flexion and extension of fingers, wrist, elbows, knees and hips as well as plantar and dorsiflexion of the foot.   Psychiatric:         Attention and Perception: She is attentive.         Speech: Speech normal.         Behavior: Behavior normal.         Thought  Content: Thought content normal.         Judgment: Judgment normal.         Procedures           ED Course  ED Course as of 06/02/23 1550   Fri Jun 02, 2023   0638 I had a good conversation with the patient as well as with her daughter, who is a clinical colleague of mine.  We have discussed the differential diagnosis and medical decision making in great detail at the bedside.  I do favor an infectious etiology such as pneumonia.  We will get an x-ray.  I have reviewed EKG independently.    EKG, on my own independent read, shows normal sinus rhythm with no dynamic ST segment elevation.  She does have evidence of LVH and left axis deviation. [CHARLY]   0639 I do not believe this is indigestion.  I do favor a pulmonic etiology.  We have discussed the potential for pulmonary embolus as well as acute coronary syndrome, pneumonitis, Viral and bacterial etiologies. [CHARLY]   0639 We will get labs and diagnostic studies.  Will consider CTA of the chest if needed.  Final impression and plan following completion of work-up. [CHARLY]   0655 Labs are pending.  Have added a lactic acid and blood cultures.  X-ray has been reviewed independently by me.  Patient appears to have left lower lobe consolidation.  We will see what the radiologist says.  Have ordered Rocephin presumptively and will place the patient on Omnicef or doxycycline as an outpatient.  I do anticipate the patient will be okay for discharge home.  Temperature here is 98.3 with oxygen saturations of 94% currently. [CHARLY]   0805 Sodium low at 129.  Most recent CMP indicated a normal sodium level.  White count looks good on the CBC at 10.22.  Likewise, BNP and lipase reassuring.  High-sensitivity troponin mildly elevated at 20.  Lactic acid pending along with blood cultures.  Awaiting official radiologic read. [CHARLY]   0825 I discussed x-rays personally with the reading radiologist.  Although she did have some abnormality noted earlier, he indicated this is old and not new.  I  would back and talk to the patient and she consistently indicates that she has had chest pain for 12+ hours.  This continues to be concerning to me without obvious etiology.  In addition, she shares with me that a cough she has had has been somewhat productive over the last couple of days as well.  While I still favor an infectious etiology, I am concerned with the idea of letting her go home without additional imaging to rule out pulmonary embolus with certainly is a possibility.  I discussed the case with the patient's daughter, my colleague and we agreed on the plan forward including CTA of the chest.  Patient is receiving IV fluid rehydration and the Rocephin.  I have ordered a CT of the chest and if normal, we will get the patient home. [CHARLY]   0922 CTA of the chest showed bilateral pneumonia which fits the clinical picture nicely.  I have cut/pasted below the official read by Dr. Alex Anderson.    Findings:  There is good contrast opacification of the pulmonary arteries. No filling defects are seen to suggest pulm embolic disease. No thoracic aortic aneurysm or dissection seen. Maximal transverse luminal diameter of the ascending aorta is 35 mm. There is   moderate coronary artery calcium. No pericardial or pleural effusion or significant mediastinal adenopathy is seen. There is again some bronchiectasis in the lingula, and subpleural scarring in the anterior left chest unchanged. Mild linear nodular   scarring in the right apex is stable.     Today's study shows peribronchial thickening, and early alveolar disease of the right middle lobe and right lower lobe consistent with developing pneumonia. Similar but milder changes are present in the left lower lobe. No dense or extensive airspace   consolidation is seen.     Included images of the upper abdomen show mild fatty liver change. No significant abnormalities are seen of the included spleen, pancreatic tail, adrenal glands, or upper renal poles. Bony structures  appear to be intact.           IMPRESSION:  Impression:     1. Bilateral pneumonia as described.     2. No evidence of pulmonary embolic disease.           Electronically Signed: Mark Anderson    6/2/2023 9:08 AM EDT    Workstation ID: [id number] [CHARLY]   0938 On reexamination, had a very nice conversation with the patient at approximately 9:30 AM Eastern time.  She has received her IV antibiotics.  We will place her on Omnicef as an outpatient.  She would like to go home if at all possible and tells me her pain is much improved.  She has received IV fluid rehydration.  Impression will include bilateral pneumonia [CHARLY]   0938 .  Plan will include Omnicef and close follow-up with her PCP.  She should return here immediately for new or changing concerns and she promises to do so. [CHARLY]   0938 Lactic came back at 1.0 which is really assuring.  No evidence of PE on the CT scan. [CHARLY]      ED Course User Index  [CHARLY] Mo Neff MD     Recent Results (from the past 24 hour(s))   ECG 12 Lead ED Triage Standing Order; Chest Pain    Collection Time: 06/02/23  6:31 AM   Result Value Ref Range    QT Interval 352 ms    QTC Interval 418 ms   High Sensitivity Troponin T    Collection Time: 06/02/23  6:49 AM    Specimen: Blood   Result Value Ref Range    HS Troponin T 20 (H) <10 ng/L   Comprehensive Metabolic Panel    Collection Time: 06/02/23  6:49 AM    Specimen: Blood   Result Value Ref Range    Glucose 109 (H) 65 - 99 mg/dL    BUN 20 8 - 23 mg/dL    Creatinine 0.92 0.57 - 1.00 mg/dL    Sodium 129 (L) 136 - 145 mmol/L    Potassium 4.8 3.5 - 5.2 mmol/L    Chloride 99 98 - 107 mmol/L    CO2 23.0 22.0 - 29.0 mmol/L    Calcium 9.4 8.2 - 9.6 mg/dL    Total Protein 6.9 6.0 - 8.5 g/dL    Albumin 3.8 3.5 - 5.2 g/dL    ALT (SGPT) 11 1 - 33 U/L    AST (SGOT) 19 1 - 32 U/L    Alkaline Phosphatase 62 39 - 117 U/L    Total Bilirubin 0.6 0.0 - 1.2 mg/dL    Globulin 3.1 gm/dL    A/G Ratio 1.2 g/dL    BUN/Creatinine Ratio 21.7 7.0 - 25.0    Anion  Gap 7.0 5.0 - 15.0 mmol/L    eGFR 59.3 (L) >60.0 mL/min/1.73   Lipase    Collection Time: 06/02/23  6:49 AM    Specimen: Blood   Result Value Ref Range    Lipase 26 13 - 60 U/L   BNP    Collection Time: 06/02/23  6:49 AM    Specimen: Blood   Result Value Ref Range    proBNP 347.3 0.0 - 1,800.0 pg/mL   Green Top (Gel)    Collection Time: 06/02/23  6:49 AM   Result Value Ref Range    Extra Tube Hold for add-ons.    Lavender Top    Collection Time: 06/02/23  6:49 AM   Result Value Ref Range    Extra Tube hold for add-on    Gold Top - SST    Collection Time: 06/02/23  6:49 AM   Result Value Ref Range    Extra Tube Hold for add-ons.    Gray Top    Collection Time: 06/02/23  6:49 AM   Result Value Ref Range    Extra Tube Hold for add-ons.    Light Blue Top    Collection Time: 06/02/23  6:49 AM   Result Value Ref Range    Extra Tube Hold for add-ons.    CBC Auto Differential    Collection Time: 06/02/23  6:49 AM    Specimen: Blood   Result Value Ref Range    WBC 10.22 3.40 - 10.80 10*3/mm3    RBC 4.04 3.77 - 5.28 10*6/mm3    Hemoglobin 11.2 (L) 12.0 - 15.9 g/dL    Hematocrit 33.9 (L) 34.0 - 46.6 %    MCV 83.9 79.0 - 97.0 fL    MCH 27.7 26.6 - 33.0 pg    MCHC 33.0 31.5 - 35.7 g/dL    RDW 13.3 12.3 - 15.4 %    RDW-SD 40.7 37.0 - 54.0 fl    MPV 8.1 6.0 - 12.0 fL    Platelets 241 140 - 450 10*3/mm3    Neutrophil % 79.1 (H) 42.7 - 76.0 %    Lymphocyte % 10.6 (L) 19.6 - 45.3 %    Monocyte % 8.9 5.0 - 12.0 %    Eosinophil % 0.5 0.3 - 6.2 %    Basophil % 0.3 0.0 - 1.5 %    Immature Grans % 0.6 (H) 0.0 - 0.5 %    Neutrophils, Absolute 8.09 (H) 1.70 - 7.00 10*3/mm3    Lymphocytes, Absolute 1.08 0.70 - 3.10 10*3/mm3    Monocytes, Absolute 0.91 (H) 0.10 - 0.90 10*3/mm3    Eosinophils, Absolute 0.05 0.00 - 0.40 10*3/mm3    Basophils, Absolute 0.03 0.00 - 0.20 10*3/mm3    Immature Grans, Absolute 0.06 (H) 0.00 - 0.05 10*3/mm3    nRBC 0.0 0.0 - 0.2 /100 WBC   Lactic Acid, Plasma    Collection Time: 06/02/23  6:49 AM    Specimen: Blood  "  Result Value Ref Range    Lactate 1.0 0.5 - 2.0 mmol/L   ECG 12 Lead ED Triage Standing Order; Chest Pain    Collection Time: 06/02/23  8:27 AM   Result Value Ref Range    QT Interval 394 ms    QTC Interval 425 ms   Lactic Acid, Plasma    Collection Time: 06/02/23  9:34 AM    Specimen: Blood   Result Value Ref Range    Lactate 0.8 0.5 - 2.0 mmol/L   High Sensitivity Troponin T 2Hr    Collection Time: 06/02/23  9:34 AM    Specimen: Blood   Result Value Ref Range    HS Troponin T 17 (H) <10 ng/L    Troponin T Delta -3 >=-4 - <+4 ng/L     Note: In addition to lab results from this visit, the labs listed above may include labs taken at another facility or during a different encounter within the last 24 hours. Please correlate lab times with ED admission and discharge times for further clarification of the services performed during this visit.    CT Angiogram Chest   Final Result   Impression:      1. Bilateral pneumonia as described.      2. No evidence of pulmonary embolic disease.            Electronically Signed: Mark Anderson     6/2/2023 9:08 AM EDT     Workstation ID: [id number]      XR Chest 1 View   Final Result   Impression:   1.An acute pulmonary process is not apparent.         Electronically Signed: Fabiano Duarte     6/2/2023 8:11 AM EDT     Workstation ID: [id number]        Vitals:    06/02/23 0621 06/02/23 0700 06/02/23 0930   BP: 143/69 112/54 110/49   BP Location: Left arm     Patient Position: Sitting     Pulse: 92 70 65   Resp: 16     Temp: 98.3 °F (36.8 °C)     TempSrc: Oral     SpO2: 94% 96% 95%   Weight: 55.8 kg (123 lb)     Height: 167.6 cm (66\")       Medications   aspirin chewable tablet 324 mg (324 mg Oral Given 6/2/23 0645)   cefTRIAXone (ROCEPHIN) 1 g/100 mL 0.9% NS (MBP) (0 g Intravenous Stopped 6/2/23 0925)   iopamidol (ISOVUE-370) 76 % injection 70 mL (70 mL Intravenous Given 6/2/23 0855)     ECG/EMG Results (last 24 hours)     ** No results found for the last 24 hours. **        ECG 12 Lead ED " Triage Standing Order; Chest Pain   Final Result   Test Reason : ED Triage Standing Order~   Blood Pressure :   */*   mmHG   Vent. Rate :  70 BPM     Atrial Rate :  70 BPM      P-R Int : 196 ms          QRS Dur : 108 ms       QT Int : 394 ms       P-R-T Axes :  50 -26  24 degrees      QTc Int : 425 ms      Normal sinus rhythm   Voltage criteria for left ventricular hypertrophy   Abnormal ECG   When compared with ECG of 02-JUN-2023 06:31, (Unconfirmed)   Nonspecific T wave abnormality now evident in Inferior leads   Confirmed by MAYRA BLAKE MD ([id number]) on 6/2/2023 3:37:06 PM      Referred By: SUKI           Confirmed By: MAYRA BLAKE MD      ECG 12 Lead ED Triage Standing Order; Chest Pain   Final Result   Test Reason : ED Triage Standing Order~   Blood Pressure :   */*   mmHG   Vent. Rate :  85 BPM     Atrial Rate :  85 BPM      P-R Int : 188 ms          QRS Dur : 108 ms       QT Int : 352 ms       P-R-T Axes :  70 -36  70 degrees      QTc Int : 418 ms      Normal sinus rhythm   Left axis deviation   Left ventricular hypertrophy with repolarization abnormality   Cannot rule out Septal infarct , age undetermined   Abnormal ECG   When compared with ECG of 16-JAN-2022 08:59,   No significant change was found   Confirmed by MAYRA BLAKE MD ([id number]) on 6/2/2023 3:36:36 PM      Referred By: EDMD           Confirmed By: MAYRA BLAKE MD                                                  Medical Decision Making  Have considered pneumonia, both bacterial and viral etiologies.  Have considered simple viral URI.  Have considered pulmonary embolus, pneumonitis, rib fracture, acute coronary syndrome, pneumothorax, aortic dissection, chest wall pain NOS.  We will start with x-ray and lab studies.  We will consider CTA of the chest.    Atypical chest pain: complicated acute illness or injury  Subacute cough: complicated acute illness or injury  Amount and/or Complexity of Data Reviewed  External Data Reviewed: notes.  Labs:  ordered. Decision-making details documented in ED Course.  Radiology: ordered. Decision-making details documented in ED Course.  ECG/medicine tests: ordered. Decision-making details documented in ED Course.      Risk  OTC drugs.  Prescription drug management.          Final diagnoses:   Subacute cough   Atypical chest pain   Pneumonia of both lower lobes due to infectious organism   Mild dehydration       ED Disposition  ED Disposition     ED Disposition   Discharge    Condition   Stable    Comment   --             Alice Kong MD  [address] [facility] [address]  [place] [id number] [phone]    In 1 week           Medication List      New Prescriptions    cefdinir 300 MG capsule  Commonly known as: OMNICEF  Take 1 capsule by mouth 2 (Two) Times a Day.           Where to Get Your Medications      These medications were sent to [id number] - [place], [place] - [address] AT [facility] [address] - [phone]  - [phone] FX  [address], [facility] [address]    Phone: [phone]   · cefdinir 300 MG capsule          Mo Neff MD  06/02/23 3179

## 2023-06-07 LAB
BACTERIA SPEC AEROBE CULT: NORMAL
BACTERIA SPEC AEROBE CULT: NORMAL

## 2023-09-25 ENCOUNTER — TRANSCRIBE ORDERS (OUTPATIENT)
Dept: ADMINISTRATIVE | Facility: HOSPITAL | Age: 88
End: 2023-09-25
Payer: MEDICARE

## 2023-09-25 DIAGNOSIS — R06.09 DYSPNEA ON EXERTION: Primary | ICD-10-CM

## 2023-09-28 ENCOUNTER — APPOINTMENT (OUTPATIENT)
Dept: GENERAL RADIOLOGY | Facility: HOSPITAL | Age: 88
End: 2023-09-28
Payer: MEDICARE

## 2023-09-28 ENCOUNTER — HOSPITAL ENCOUNTER (EMERGENCY)
Facility: HOSPITAL | Age: 88
Discharge: HOME | End: 2023-09-28
Attending: EMERGENCY MEDICINE
Payer: MEDICARE

## 2023-09-28 VITALS
OXYGEN SATURATION: 93 % | BODY MASS INDEX: 19.29 KG/M2 | RESPIRATION RATE: 20 BRPM | TEMPERATURE: 97.9 F | WEIGHT: 120 LBS | DIASTOLIC BLOOD PRESSURE: 67 MMHG | SYSTOLIC BLOOD PRESSURE: 128 MMHG | HEIGHT: 66 IN | HEART RATE: 72 BPM

## 2023-09-28 DIAGNOSIS — R07.9 CHEST PAIN, UNSPECIFIED TYPE: Primary | ICD-10-CM

## 2023-09-28 LAB
ALBUMIN SERPL-MCNC: 3.9 G/DL (ref 3.5–5.2)
ALBUMIN/GLOB SERPL: 1.4 G/DL
ALP SERPL-CCNC: 61 U/L (ref 39–117)
ALT SERPL W P-5'-P-CCNC: 13 U/L (ref 1–33)
ANION GAP SERPL CALCULATED.3IONS-SCNC: 9 MMOL/L (ref 5–15)
AST SERPL-CCNC: 23 U/L (ref 1–32)
BASOPHILS # BLD AUTO: 0.04 10*3/MM3 (ref 0–0.2)
BASOPHILS NFR BLD AUTO: 0.6 % (ref 0–1.5)
BILIRUB SERPL-MCNC: 0.3 MG/DL (ref 0–1.2)
BUN SERPL-MCNC: 28 MG/DL (ref 8–23)
BUN/CREAT SERPL: 34.6 (ref 7–25)
CALCIUM SPEC-SCNC: 9.7 MG/DL (ref 8.2–9.6)
CHLORIDE SERPL-SCNC: 103 MMOL/L (ref 98–107)
CO2 SERPL-SCNC: 27 MMOL/L (ref 22–29)
CREAT SERPL-MCNC: 0.81 MG/DL (ref 0.57–1)
DEPRECATED RDW RBC AUTO: 40.9 FL (ref 37–54)
EGFRCR SERPLBLD CKD-EPI 2021: 69.1 ML/MIN/1.73
EOSINOPHIL # BLD AUTO: 0.09 10*3/MM3 (ref 0–0.4)
EOSINOPHIL NFR BLD AUTO: 1.4 % (ref 0.3–6.2)
ERYTHROCYTE [DISTWIDTH] IN BLOOD BY AUTOMATED COUNT: 13.2 % (ref 12.3–15.4)
GEN 5 2HR TROPONIN T REFLEX: 19 NG/L
GLOBULIN UR ELPH-MCNC: 2.8 GM/DL
GLUCOSE SERPL-MCNC: 90 MG/DL (ref 65–99)
HCT VFR BLD AUTO: 36.5 % (ref 34–46.6)
HGB BLD-MCNC: 11.9 G/DL (ref 12–15.9)
HOLD SPECIMEN: NORMAL
IMM GRANULOCYTES # BLD AUTO: 0.04 10*3/MM3 (ref 0–0.05)
IMM GRANULOCYTES NFR BLD AUTO: 0.6 % (ref 0–0.5)
LIPASE SERPL-CCNC: 26 U/L (ref 13–60)
LYMPHOCYTES # BLD AUTO: 1.66 10*3/MM3 (ref 0.7–3.1)
LYMPHOCYTES NFR BLD AUTO: 25 % (ref 19.6–45.3)
MCH RBC QN AUTO: 27.2 PG (ref 26.6–33)
MCHC RBC AUTO-ENTMCNC: 32.6 G/DL (ref 31.5–35.7)
MCV RBC AUTO: 83.5 FL (ref 79–97)
MONOCYTES # BLD AUTO: 0.63 10*3/MM3 (ref 0.1–0.9)
MONOCYTES NFR BLD AUTO: 9.5 % (ref 5–12)
NEUTROPHILS NFR BLD AUTO: 4.18 10*3/MM3 (ref 1.7–7)
NEUTROPHILS NFR BLD AUTO: 62.9 % (ref 42.7–76)
NRBC BLD AUTO-RTO: 0 /100 WBC (ref 0–0.2)
NT-PROBNP SERPL-MCNC: 276.9 PG/ML (ref 0–1800)
PLATELET # BLD AUTO: 284 10*3/MM3 (ref 140–450)
PMV BLD AUTO: 8.3 FL (ref 6–12)
POTASSIUM SERPL-SCNC: 4.2 MMOL/L (ref 3.5–5.2)
PROT SERPL-MCNC: 6.7 G/DL (ref 6–8.5)
QT INTERVAL: 370 MS
QT INTERVAL: 418 MS
QTC INTERVAL: 429 MS
QTC INTERVAL: 438 MS
RBC # BLD AUTO: 4.37 10*6/MM3 (ref 3.77–5.28)
SODIUM SERPL-SCNC: 139 MMOL/L (ref 136–145)
TROPONIN T DELTA: 1 NG/L
TROPONIN T SERPL HS-MCNC: 18 NG/L
WBC NRBC COR # BLD: 6.64 10*3/MM3 (ref 3.4–10.8)
WHOLE BLOOD HOLD COAG: NORMAL
WHOLE BLOOD HOLD SPECIMEN: NORMAL

## 2023-09-28 PROCEDURE — 85025 COMPLETE CBC W/AUTO DIFF WBC: CPT | Performed by: EMERGENCY MEDICINE

## 2023-09-28 PROCEDURE — 99284 EMERGENCY DEPT VISIT MOD MDM: CPT

## 2023-09-28 PROCEDURE — 71045 X-RAY EXAM CHEST 1 VIEW: CPT

## 2023-09-28 PROCEDURE — 83690 ASSAY OF LIPASE: CPT | Performed by: EMERGENCY MEDICINE

## 2023-09-28 PROCEDURE — 93005 ELECTROCARDIOGRAM TRACING: CPT

## 2023-09-28 PROCEDURE — 84484 ASSAY OF TROPONIN QUANT: CPT | Performed by: EMERGENCY MEDICINE

## 2023-09-28 PROCEDURE — 96374 THER/PROPH/DIAG INJ IV PUSH: CPT

## 2023-09-28 PROCEDURE — 25010000002 LORAZEPAM PER 2 MG: Performed by: EMERGENCY MEDICINE

## 2023-09-28 PROCEDURE — 93005 ELECTROCARDIOGRAM TRACING: CPT | Performed by: EMERGENCY MEDICINE

## 2023-09-28 PROCEDURE — 80053 COMPREHEN METABOLIC PANEL: CPT | Performed by: EMERGENCY MEDICINE

## 2023-09-28 PROCEDURE — 83880 ASSAY OF NATRIURETIC PEPTIDE: CPT | Performed by: EMERGENCY MEDICINE

## 2023-09-28 PROCEDURE — 36415 COLL VENOUS BLD VENIPUNCTURE: CPT

## 2023-09-28 RX ORDER — LORAZEPAM 2 MG/ML
0.5 INJECTION INTRAMUSCULAR ONCE
Status: COMPLETED | OUTPATIENT
Start: 2023-09-28 | End: 2023-09-28

## 2023-09-28 RX ORDER — HYDROXYCHLOROQUINE SULFATE 200 MG/1
100 TABLET, FILM COATED ORAL EVERY MORNING
COMMUNITY
Start: 2023-07-24

## 2023-09-28 RX ORDER — NITROGLYCERIN 0.4 MG/1
0.4 TABLET SUBLINGUAL
Status: DISCONTINUED | OUTPATIENT
Start: 2023-09-28 | End: 2023-09-28 | Stop reason: HOSPADM

## 2023-09-28 RX ORDER — ASPIRIN 81 MG/1
324 TABLET, CHEWABLE ORAL ONCE
Status: COMPLETED | OUTPATIENT
Start: 2023-09-28 | End: 2023-09-28

## 2023-09-28 RX ORDER — ALENDRONATE SODIUM 70 MG/1
70 TABLET ORAL
COMMUNITY

## 2023-09-28 RX ORDER — SODIUM CHLORIDE 0.9 % (FLUSH) 0.9 %
10 SYRINGE (ML) INJECTION AS NEEDED
Status: DISCONTINUED | OUTPATIENT
Start: 2023-09-28 | End: 2023-09-28 | Stop reason: HOSPADM

## 2023-09-28 RX ORDER — DULOXETIN HYDROCHLORIDE 20 MG/1
30 CAPSULE, DELAYED RELEASE ORAL DAILY
COMMUNITY
Start: 2023-09-20

## 2023-09-28 RX ORDER — ACETAMINOPHEN 500 MG
500 TABLET ORAL 2 TIMES DAILY
COMMUNITY

## 2023-09-28 RX ADMIN — NITROGLYCERIN 0.4 MG: 0.4 TABLET SUBLINGUAL at 09:51

## 2023-09-28 RX ADMIN — LORAZEPAM 0.5 MG: 2 INJECTION INTRAMUSCULAR; INTRAVENOUS at 10:45

## 2023-09-28 RX ADMIN — ALUMINUM HYDROXIDE, MAGNESIUM HYDROXIDE, AND DIMETHICONE: 400; 400; 40 SUSPENSION ORAL at 10:57

## 2023-09-28 RX ADMIN — ASPIRIN 324 MG: 81 TABLET, CHEWABLE ORAL at 09:01

## 2023-09-28 NOTE — ED PROVIDER NOTES
Subjective   History of Present Illness    Pt presents with chest pain.  She woke around 0230 with diffuse chest tightness and dyspnea.  It is better now but not gone.  She denies fever, cough, leg swelling.  She denies history of CAD.    She was started on Lyrica about a week ago.  A few months ago she was taken off of her antihypertensives due to dizziness and relative says her BPs have been pretty good since then despite the change.    History provided by:  Patient and relative    Review of Systems   Constitutional:  Negative for fever.   Respiratory:  Positive for shortness of breath.    Cardiovascular:  Positive for chest pain. Negative for palpitations and leg swelling.   Gastrointestinal:  Negative for abdominal pain.   All other systems reviewed and are negative.    Past Medical History:   Diagnosis Date    Arthritis     Breast cancer 2016    left    Cancer     skin cancer    Hx of radiation therapy 2016    left breast    Hypertension     RA (rheumatoid arthritis)        No Known Allergies    Past Surgical History:   Procedure Laterality Date    BREAST BIOPSY Left 06/10/2016     bx    BREAST CYST EXCISION Right 1950's    BREAST LUMPECTOMY Left 06/22/2016       Family History   Problem Relation Age of Onset    Breast cancer Neg Hx     Ovarian cancer Neg Hx     Endometrial cancer Neg Hx        Social History     Socioeconomic History    Marital status:    Tobacco Use    Smoking status: Never    Smokeless tobacco: Never   Substance and Sexual Activity    Alcohol use: No    Drug use: No    Sexual activity: Defer           Objective   Physical Exam  Vitals and nursing note reviewed.   Constitutional:       General: She is not in acute distress.     Appearance: Normal appearance. She is not ill-appearing.   HENT:      Head: Normocephalic and atraumatic.      Mouth/Throat:      Mouth: Mucous membranes are moist.   Eyes:      General: No scleral icterus.        Right eye: No discharge.         Left eye: No  discharge.      Conjunctiva/sclera: Conjunctivae normal.   Cardiovascular:      Rate and Rhythm: Normal rate and regular rhythm.      Heart sounds: No murmur heard.  Pulmonary:      Effort: Pulmonary effort is normal. No respiratory distress.      Breath sounds: Normal breath sounds. No wheezing.   Abdominal:      General: Bowel sounds are normal. There is no distension.      Palpations: Abdomen is soft.      Tenderness: There is no abdominal tenderness. There is no guarding or rebound.   Musculoskeletal:         General: No swelling. Normal range of motion.      Cervical back: Normal range of motion and neck supple.      Right lower leg: No tenderness. No edema.      Left lower leg: No tenderness. No edema.   Skin:     General: Skin is warm and dry.      Findings: No rash.   Neurological:      General: No focal deficit present.      Mental Status: She is alert. Mental status is at baseline.   Psychiatric:         Mood and Affect: Mood normal.         Behavior: Behavior normal.         Thought Content: Thought content normal.       Procedures           ED Course    I reviewed charts.  ED note 6/2/23 for chest pain with dx pneumonia at that visit.       EKG NSR with no significant change from 6/2/23.       Labs benign.  Negative troponin delta.  CXR negative.    NTG given and she didn't feel a lot better.  Very anxious on recheck, Ativan given with relief.  GI cocktail given and she feels much better.    Two negative sets.  Pain free on rechecks.    HEART = 4.    Chest pain free on rechecks.  Workup including two cardiac sets is negative for acute serious pathology.  I discussed findings and concerns with patient in detail.  We discussed in particular that while acute MI has been excluded today, it is not possible to exclude underlying CAD based on ED workup alone.  We discussed the importance of immediate-term followup for provocative testing such as stress testing as well as methods of arranging that.  She is not  interested in admission today.  Her daughter and son-in-law are physicians and I spoke with them as well and we established a plan together with patient's input.    We discussed the importance of office followup subsequent to testing to discuss results and any further testing or treatment that may be indicated.  Patient is advised to return to the ED for any concerning symptoms, especially prior to the completion of further outpatient testing.                                 Medical Decision Making  Problems Addressed:  Chest pain, unspecified type: complicated acute illness or injury    Amount and/or Complexity of Data Reviewed  Independent Historian: caregiver     Details: relatives  External Data Reviewed: ECG and notes.  Labs: ordered. Decision-making details documented in ED Course.  Radiology: ordered and independent interpretation performed. Decision-making details documented in ED Course.     Details: CXR NAD, my read  ECG/medicine tests: ordered and independent interpretation performed. Decision-making details documented in ED Course.     Details: EKG NSR IVCD, unchanged from prior, my read    Risk  Prescription drug management.  Decision regarding hospitalization.        Final diagnoses:   Chest pain, unspecified type       ED Disposition  ED Disposition       ED Disposition   Discharge    Condition   Stable    Comment   --               Alice Kong MD  [address] [facility] [address]  [place] [address]  [phone]          [facility] CARDIOLOGY  [address]  [facility] [address] [phone]  Schedule an appointment as soon as possible for a visit   they will call you to schedule         Medication List      No changes were made to your prescriptions during this visit.            Jimenez Farias MD  09/28/23 6667

## 2023-10-09 LAB
QT INTERVAL: 370 MS
QT INTERVAL: 418 MS
QTC INTERVAL: 429 MS
QTC INTERVAL: 438 MS

## 2023-10-11 ENCOUNTER — HOSPITAL ENCOUNTER (OUTPATIENT)
Dept: CARDIOLOGY | Facility: HOSPITAL | Age: 88
Discharge: HOME | End: 2023-10-11
Payer: MEDICARE

## 2023-10-11 ENCOUNTER — OFFICE VISIT (OUTPATIENT)
Dept: CARDIOLOGY | Facility: HOSPITAL | Age: 88
End: 2023-10-11
Payer: MEDICARE

## 2023-10-11 VITALS
HEART RATE: 88 BPM | RESPIRATION RATE: 20 BRPM | DIASTOLIC BLOOD PRESSURE: 72 MMHG | HEIGHT: 66 IN | WEIGHT: 131.25 LBS | OXYGEN SATURATION: 95 % | SYSTOLIC BLOOD PRESSURE: 149 MMHG | BODY MASS INDEX: 21.09 KG/M2 | TEMPERATURE: 97.4 F

## 2023-10-11 DIAGNOSIS — I10 ESSENTIAL HYPERTENSION: ICD-10-CM

## 2023-10-11 DIAGNOSIS — R07.89 CHEST PAIN, ATYPICAL: ICD-10-CM

## 2023-10-11 DIAGNOSIS — R07.89 CHEST PAIN, ATYPICAL: Primary | ICD-10-CM

## 2023-10-11 DIAGNOSIS — R06.09 DOE (DYSPNEA ON EXERTION): ICD-10-CM

## 2023-10-11 LAB
BH CV ECHO MEAS - AI P1/2T: 415.3 MSEC
BH CV ECHO MEAS - AO MAX PG: 8.3 MMHG
BH CV ECHO MEAS - AO MEAN PG: 4.8 MMHG
BH CV ECHO MEAS - AO ROOT DIAM: 3.5 CM
BH CV ECHO MEAS - AO V2 MAX: 143.8 CM/SEC
BH CV ECHO MEAS - AO V2 VTI: 32.6 CM
BH CV ECHO MEAS - AVA(I,D): 2 CM2
BH CV ECHO MEAS - EDV(CUBED): 79.5 ML
BH CV ECHO MEAS - EDV(MOD-SP2): 90.8 ML
BH CV ECHO MEAS - EDV(MOD-SP4): 91.3 ML
BH CV ECHO MEAS - EF(MOD-BP): 70.4 %
BH CV ECHO MEAS - EF(MOD-SP2): 69.8 %
BH CV ECHO MEAS - EF(MOD-SP4): 70.3 %
BH CV ECHO MEAS - ESV(CUBED): 13.8 ML
BH CV ECHO MEAS - ESV(MOD-SP2): 27.4 ML
BH CV ECHO MEAS - ESV(MOD-SP4): 27.1 ML
BH CV ECHO MEAS - FS: 44.2 %
BH CV ECHO MEAS - IVS/LVPW: 0.9 CM
BH CV ECHO MEAS - IVSD: 0.9 CM
BH CV ECHO MEAS - LA DIMENSION: 3.5 CM
BH CV ECHO MEAS - LAT PEAK E' VEL: 6.7 CM/SEC
BH CV ECHO MEAS - LV DIASTOLIC VOL/BSA (35-75): 54.6 CM2
BH CV ECHO MEAS - LV MASS(C)D: 132.7 GRAMS
BH CV ECHO MEAS - LV MAX PG: 3.3 MMHG
BH CV ECHO MEAS - LV MEAN PG: 2 MMHG
BH CV ECHO MEAS - LV SYSTOLIC VOL/BSA (12-30): 16.2 CM2
BH CV ECHO MEAS - LV V1 MAX: 90.4 CM/SEC
BH CV ECHO MEAS - LV V1 VTI: 20.8 CM
BH CV ECHO MEAS - LVIDD: 4.3 CM
BH CV ECHO MEAS - LVIDS: 2.4 CM
BH CV ECHO MEAS - LVOT AREA: 3.1 CM2
BH CV ECHO MEAS - LVOT DIAM: 2 CM
BH CV ECHO MEAS - LVPWD: 1 CM
BH CV ECHO MEAS - MED PEAK E' VEL: 6.7 CM/SEC
BH CV ECHO MEAS - MR MAX PG: 158.6 MMHG
BH CV ECHO MEAS - MR MAX VEL: 629.5 CM/SEC
BH CV ECHO MEAS - MR MEAN PG: 87.5 MMHG
BH CV ECHO MEAS - MR MEAN VEL: 421.5 CM/SEC
BH CV ECHO MEAS - MR VTI: 250 CM
BH CV ECHO MEAS - MV A MAX VEL: 64.2 CM/SEC
BH CV ECHO MEAS - MV DEC SLOPE: 199 CM/SEC2
BH CV ECHO MEAS - MV DEC TIME: 0.27 SEC
BH CV ECHO MEAS - MV E MAX VEL: 43.1 CM/SEC
BH CV ECHO MEAS - MV E/A: 0.67
BH CV ECHO MEAS - MV P1/2T: 83.2 MSEC
BH CV ECHO MEAS - MVA(P1/2T): 2.6 CM2
BH CV ECHO MEAS - PA ACC TIME: 0.11 SEC
BH CV ECHO MEAS - PA V2 MAX: 99.4 CM/SEC
BH CV ECHO MEAS - PAPD(PI EDV): 8 MMHG
BH CV ECHO MEAS - PI END-D VEL: 143 CM/SEC
BH CV ECHO MEAS - RAP SYSTOLE: 8 MMHG
BH CV ECHO MEAS - RVSP: 28 MMHG
BH CV ECHO MEAS - SI(MOD-SP2): 37.9 ML/M2
BH CV ECHO MEAS - SI(MOD-SP4): 38.4 ML/M2
BH CV ECHO MEAS - SV(LVOT): 65.2 ML
BH CV ECHO MEAS - SV(MOD-SP2): 63.4 ML
BH CV ECHO MEAS - SV(MOD-SP4): 64.2 ML
BH CV ECHO MEAS - TAPSE (>1.6): 2.7 CM
BH CV ECHO MEAS - TR MAX PG: 19.7 MMHG
BH CV ECHO MEAS - TR MAX VEL: 222 CM/SEC
BH CV ECHO MEASUREMENTS AVERAGE E/E' RATIO: 6.43
BH CV VAS BP LEFT ARM: NORMAL MMHG
BH CV XLRA - RV BASE: 4.9 CM
BH CV XLRA - RV LENGTH: 6.6 CM
BH CV XLRA - RV MID: 3.8 CM
BH CV XLRA - TDI S': 15.2 CM/SEC
LEFT ATRIUM VOLUME INDEX: 34.6 ML/M2
LV EF 2D ECHO EST: 70 %

## 2023-10-11 PROCEDURE — 93306 TTE W/DOPPLER COMPLETE: CPT

## 2023-10-11 RX ORDER — NITROGLYCERIN 0.4 MG/1
TABLET SUBLINGUAL
Qty: 100 TABLET | Refills: 11 | Status: SHIPPED | OUTPATIENT
Start: 2023-10-11

## 2023-10-12 ENCOUNTER — TELEPHONE (OUTPATIENT)
Dept: CARDIOLOGY | Facility: HOSPITAL | Age: 88
End: 2023-10-12
Payer: MEDICARE

## 2023-10-12 NOTE — TELEPHONE ENCOUNTER
Reviewed echo with patient's daughter Dr Hale. Patient's daughter will notify office if patient has another episode of chest pain

## 2023-10-16 NOTE — PROGRESS NOTES
"Chief Complaint  Slow Heart Rate and Establish Care    Subjective    History of Present Illness {CC  Problem List  Visit  Diagnosis   Encounters  Notes  Medications  Labs  Result Review Imaging  Media :23}       History of Present Illness   89 year old female presents to the office today for ongoing evaluation of her atypical chest pain.  Patient presented to [facility] ED on 9/28/2023 with complaints of chest pain that awoke her from sleep.  She notes the chest pain was sharp in duration located in the center of her chest and did not last very long.  She did have associated dyspnea on exertion but reports that has resolved as well.  She does report she is able to complete her activities of daily living without chest pain or dyspnea.  History of hypertension, left breast cancer, rheumatoid arthritis, COVID-19  Objective     Vital Signs:   Vitals:    10/11/23 1334 10/11/23 1336 10/11/23 1338   BP: 158/83 155/80 149/72   BP Location: Right arm Left arm Left arm   Patient Position: Sitting Standing Sitting   Cuff Size: Adult Adult Adult   Pulse: 89 99 88   Resp:   20   Temp:   97.4 °F (36.3 °C)   TempSrc:   Temporal   SpO2: 97% 98% 95%   Weight:   59.5 kg (131 lb 4 oz)   Height:   167.6 cm (66\")     Body mass index is 21.18 kg/m².  Physical Exam  Vitals and nursing note reviewed.   Constitutional:       Appearance: Normal appearance.   HENT:      Head: Normocephalic.   Eyes:      Pupils: Pupils are equal, round, and reactive to light.   Cardiovascular:      Rate and Rhythm: Normal rate and regular rhythm.      Pulses: Normal pulses.      Heart sounds: Normal heart sounds. No murmur heard.  Pulmonary:      Effort: Pulmonary effort is normal.      Breath sounds: Normal breath sounds.   Abdominal:      General: Bowel sounds are normal.      Palpations: Abdomen is soft.   Musculoskeletal:         General: Normal range of motion.      Cervical back: Normal range of motion.      Right lower leg: No " edema.      Left lower leg: No edema.   Skin:     General: Skin is warm and dry.      Capillary Refill: Capillary refill takes less than 2 seconds.   Neurological:      Mental Status: She is alert and oriented to person, place, and time.   Psychiatric:         Mood and Affect: Mood normal.         Thought Content: Thought content normal.              Result Review  Data Reviewed:{ Labs  Result Review  Imaging  Med Tab  Media :23}   ECG 12 Lead ED Triage Standing Order; Chest Pain (09/28/2023 08:19)  ECG 12 Lead ED Triage Standing Order; Chest Pain (09/28/2023 11:00)  Adult Transthoracic Echo Complete W/ Cont if Necessary Per Protocol (10/11/2023 17:50)  High Sensitivity Troponin T (09/28/2023 08:48)  CBC & Differential (09/28/2023 08:48)  Comprehensive Metabolic Panel (09/28/2023 08:48)  Lipase (09/28/2023 08:48)  BNP (09/28/2023 08:48)  High Sensitivity Troponin T 2Hr (09/28/2023 11:03)         Assessment and Plan {CC Problem List  Visit Diagnosis  ROS  Review (Popup)  Health Maintenance  Quality  BestPractice  Medications  SmartSets  SnapShot Encounters  Media :23}   1. Chest pain, atypical  Occurred during sleep and patient has not experienced any anginal symptoms with exertion   - Adult Transthoracic Echo Complete W/ Cont if Necessary Per Protocol; Future    2. Essential hypertension  Stable   - Adult Transthoracic Echo Complete W/ Cont if Necessary Per Protocol; Future    3. MZAA (dyspnea on exertion)    - Adult Transthoracic Echo Complete W/ Cont if Necessary Per Protocol; Future          Follow Up {Instructions Charge Capture  Follow-up Communications :23}   Return if symptoms worsen or fail to improve.    Patient was given instructions and counseling regarding her condition or for health maintenance advice. Please see specific information pulled into the AVS if appropriate.  Patient was instructed to call the [facility] with any questions, concerns, or worsening symptoms.

## 2024-09-26 ENCOUNTER — HOSPITAL ENCOUNTER (EMERGENCY)
Facility: HOSPITAL | Age: 89
Discharge: HOME | End: 2024-09-26
Attending: STUDENT IN AN ORGANIZED HEALTH CARE EDUCATION/TRAINING PROGRAM
Payer: MEDICARE

## 2024-09-26 VITALS
TEMPERATURE: 98.3 F | BODY MASS INDEX: 19.61 KG/M2 | WEIGHT: 122 LBS | HEART RATE: 98 BPM | DIASTOLIC BLOOD PRESSURE: 105 MMHG | OXYGEN SATURATION: 95 % | RESPIRATION RATE: 16 BRPM | HEIGHT: 66 IN | SYSTOLIC BLOOD PRESSURE: 179 MMHG

## 2024-09-26 DIAGNOSIS — I10 ELEVATED BLOOD PRESSURE READING WITH DIAGNOSIS OF HYPERTENSION: ICD-10-CM

## 2024-09-26 DIAGNOSIS — R04.0 EPISTAXIS: Primary | ICD-10-CM

## 2024-09-26 LAB
BASOPHILS # BLD AUTO: 0.05 10*3/MM3 (ref 0–0.2)
BASOPHILS NFR BLD AUTO: 0.5 % (ref 0–1.5)
DEPRECATED RDW RBC AUTO: 42.6 FL (ref 37–54)
EOSINOPHIL # BLD AUTO: 0.06 10*3/MM3 (ref 0–0.4)
EOSINOPHIL NFR BLD AUTO: 0.6 % (ref 0.3–6.2)
ERYTHROCYTE [DISTWIDTH] IN BLOOD BY AUTOMATED COUNT: 13.6 % (ref 12.3–15.4)
HCT VFR BLD AUTO: 38.7 % (ref 34–46.6)
HGB BLD-MCNC: 12.7 G/DL (ref 12–15.9)
IMM GRANULOCYTES # BLD AUTO: 0.12 10*3/MM3 (ref 0–0.05)
IMM GRANULOCYTES NFR BLD AUTO: 1.1 % (ref 0–0.5)
LYMPHOCYTES # BLD AUTO: 1.96 10*3/MM3 (ref 0.7–3.1)
LYMPHOCYTES NFR BLD AUTO: 18.6 % (ref 19.6–45.3)
MCH RBC QN AUTO: 28.3 PG (ref 26.6–33)
MCHC RBC AUTO-ENTMCNC: 32.8 G/DL (ref 31.5–35.7)
MCV RBC AUTO: 86.4 FL (ref 79–97)
MONOCYTES # BLD AUTO: 0.75 10*3/MM3 (ref 0.1–0.9)
MONOCYTES NFR BLD AUTO: 7.1 % (ref 5–12)
NEUTROPHILS NFR BLD AUTO: 7.59 10*3/MM3 (ref 1.7–7)
NEUTROPHILS NFR BLD AUTO: 72.1 % (ref 42.7–76)
NRBC BLD AUTO-RTO: 0 /100 WBC (ref 0–0.2)
PLATELET # BLD AUTO: 222 10*3/MM3 (ref 140–450)
PMV BLD AUTO: 8.5 FL (ref 6–12)
RBC # BLD AUTO: 4.48 10*6/MM3 (ref 3.77–5.28)
WBC NRBC COR # BLD AUTO: 10.53 10*3/MM3 (ref 3.4–10.8)

## 2024-09-26 PROCEDURE — 85025 COMPLETE CBC W/AUTO DIFF WBC: CPT | Performed by: PHYSICIAN ASSISTANT

## 2024-09-26 PROCEDURE — 36415 COLL VENOUS BLD VENIPUNCTURE: CPT

## 2024-09-26 PROCEDURE — 99283 EMERGENCY DEPT VISIT LOW MDM: CPT

## 2024-09-26 RX ORDER — AMLODIPINE BESYLATE 2.5 MG/1
2.5 TABLET ORAL DAILY
Qty: 30 TABLET | Refills: 0 | Status: SHIPPED | OUTPATIENT
Start: 2024-09-26 | End: 2024-10-26

## 2024-09-26 RX ORDER — AMLODIPINE BESYLATE 2.5 MG/1
2.5 TABLET ORAL ONCE
Status: COMPLETED | OUTPATIENT
Start: 2024-09-26 | End: 2024-09-26

## 2024-09-26 RX ADMIN — AMLODIPINE BESYLATE 2.5 MG: 2.5 TABLET ORAL at 21:54

## 2024-09-27 NOTE — ED PROVIDER NOTES
EMERGENCY DEPARTMENT ENCOUNTER    Pt Name: Alessio Kwon  MRN: [id number]  Pt :   1932  Room Number:  Room/bed info not found  Date of encounter:  2024  PCP: Alice Kong MD  ED Provider: MATTY Sanchez    Historian: Patient    HPI:  Chief Complaint: Nosebleed    Context: Alessio Kwon is a 89 y.o. female who presents to the ED c/o nosebleed.  Patient reports that she was going to brush her teeth this morning and when she was positioning to brush her teeth she noticed that her nose was bleeding.  She states that throughout the day today she has experienced 3 separate times where her nose was bleeding.  She denies any blood thinners.  Patient does have history of arthritis and has long fingernails and she believes that she may have nicked the inside of her nose when going to brush her teeth.  She denies any additional complaints on interview exam.  HPI     REVIEW OF SYSTEMS  A chief complaint appropriate review of systems was completed and is negative except as noted in the HPI.     PAST MEDICAL HISTORY  Past Medical History:   Diagnosis Date    Arthritis     Breast cancer 2016    left    Cancer     skin cancer    Hx of radiation therapy 2016    left breast    Hypertension     RA (rheumatoid arthritis)        PAST SURGICAL HISTORY  Past Surgical History:   Procedure Laterality Date    BREAST BIOPSY Left 06/10/2016    US bx    BREAST CYST EXCISION Right 1950's    BREAST LUMPECTOMY Left 2016       FAMILY HISTORY  Family History   Problem Relation Age of Onset    Breast cancer Neg Hx     Ovarian cancer Neg Hx     Endometrial cancer Neg Hx        SOCIAL HISTORY  Social History     Socioeconomic History    Marital status:    Tobacco Use    Smoking status: Never    Smokeless tobacco: Never   Vaping Use    Vaping status: Never Used   Substance and Sexual Activity    Alcohol use: No    Drug use: No    Sexual activity: Defer       ALLERGIES  Patient has no known allergies.    PHYSICAL  "EXAM  Physical Exam  Vitals and nursing note reviewed.   Constitutional:       General: She is not in acute distress.     Appearance: Normal appearance. She is not ill-appearing or toxic-appearing.   HENT:      Head: Normocephalic and atraumatic.      Nose: Nose normal.      Right Nostril: Epistaxis present. No foreign body, septal hematoma or occlusion.      Comments: There is a small laceration located on the medial aspect of patient's right nasal septum.  Bleeding controlled.     Mouth/Throat:      Mouth: Mucous membranes are moist.   Eyes:      Extraocular Movements: Extraocular movements intact.   Pulmonary:      Effort: Pulmonary effort is normal.   Musculoskeletal:         General: Normal range of motion.      Cervical back: Normal range of motion and neck supple.   Skin:     General: Skin is warm and dry.   Neurological:      General: No focal deficit present.      Mental Status: She is alert.   Psychiatric:         Mood and Affect: Mood normal.         Behavior: Behavior normal.       LAB RESULTS  WBC 10.53   RBC 4.48   Hemoglobin 12.7   Hematocrit 38.7   MCV 86.4   MCH 28.3   MCHC 32.8   RDW 13.6   RDW-SD 42.6   MPV 8.5   Platelets 222   Neutrophil Rel % 72.1   Lymphocyte Rel % 18.6   Monocyte Rel % 7.1   Eosinophil Rel % 0.6   Basophil Rel % 0.5   Immature Granulocyte Rel % 1.1   Neutrophils Absolute 7.59   Lymphocytes Absolute 1.96   Monocytes Absolute 0.75   Eosinophils Absolute 0.06   Basophils Absolute 0.05   Immature Grans, Absolute 0.12   nRBC 0.0       No orders to display     Vitals:    09/26/24 1939 09/26/24 1942 09/26/24 2129   BP:  (!) 167/118 (!) 179/105   BP Location:   Right arm   Patient Position:   Sitting   Pulse: 111  98   Resp: 16     Temp: 98.3 °F (36.8 °C)     TempSrc: Oral     SpO2: 95%  95%   Weight: 55.3 kg (122 lb)     Height: 167.6 cm (66\")       Medications   amLODIPine (NORVASC) tablet 2.5 mg (2.5 mg Oral Given 9/26/24 2154)     ECG/EMG Results (last 24 hours)       ** No " results found for the last 24 hours. **          No orders to display          If labs were ordered, I independently reviewed the results and considered them in treating the patient.    RADIOLOGY  No orders to display     [] Radiologist's Report Reviewed:  I ordered and independently interpreted the above noted radiographic studies.  See radiologist's dictation for official interpretation.      PROCEDURES    Procedures    No orders to display       MEDICATIONS GIVEN IN ER    Medications   amLODIPine (NORVASC) tablet 2.5 mg (2.5 mg Oral Given 9/26/24 2155)       MEDICAL DECISION MAKING, PROGRESS, and CONSULTS   Medical Decision Making  This is a pleasant non-toxic appearing 89 year old female who presents to the ER with nosebleed. Evidence of abrasion to septum on physical exam. Patient with 3 separate episodes of epistaxis during the day. Hypertensive in ED with history of hypertension. Shared decision making made with multiple parties including patient family at bedside, family physician and ER medical directory who were all in agreement with treatment and disposition plan that included symptomatic treatment of her nosebleed and treating patient's hypertension with previously prescribed antihypertensive agent. Patient hemodynamically stable with H&H within normal limits. Patient discharged with recommendations for close follow up on Monday to primary care; return precautions provided.     Problems Addressed:  Elevated blood pressure reading with diagnosis of hypertension: complicated acute illness or injury  Epistaxis: complicated acute illness or injury    Amount and/or Complexity of Data Reviewed  Independent Historian: caregiver  Labs: ordered. Decision-making details documented in ED Course.    Risk  Prescription drug management.    Discussion below represents my analysis of pertinent findings related to patient's condition, differential diagnosis, treatment plan and final disposition.    Differential  diagnosis:  The differential diagnosis associated with the patient's presentation includes: local causes including idiopathic/ multifactorial, trauma,  inflammatory/ Infectious, and systemic.   Additional sources  Discussed/ obtained information from independent historians:   [] Spouse  [] Parent  [x] Family member  [] Friend  [] EMS   [] Other:  External (non-ED) record review:   [] Inpatient record:   [] Office record:   [] Outpatient record:   [] Prior Outpatient labs:   [] Prior Outpatient radiology:   [] Primary Care record:   [] Outside ED record:   [] Other:   Patient's care impacted by:   [] Diabetes  [x] Hypertension  [] Hyperlipidemia  [] Hypothyroidism   [] Coronary Artery Disease   [] COPD   [] Cancer   [] Obesity  [] GERD   [] Tobacco Abuse   [] Substance Abuse    [] Anxiety   [] Depression   [x] Other: Rheumatoid arthritis  Care significantly affected by Social Determinants of Health (housing and economic circumstances, unemployment)    [] Yes     [x] No   If yes, Patient's care significantly limited by  Social Determinants of Health including:   [] Inadequate housing   [] Low income   [] Alcoholism and drug addiction in family   [] Problems related to primary support group   [] Unemployment   [] Problems related to employment   [] Other Social Determinants of Health:   Shared decision making: Case was reviewed with medical director Dr. Philip who was communicating with patient's family via telephone. Family member and treatment team all in agreement with patient being hemodynamically stable and evidence of trauma as suspected source of nosebleed patient can follow up outpatient. There was concern from family about patient's elevated blood pressure which patient reports is normal. Patient with history of hypertension. Per recommendation from family physician patient was given one dose of previously prescribed amlodipine 2.5mg in ER and prescribed this antihypertensive for continued treatment of her  blood pressure outpatient.     Orders placed during this visit:  Orders Placed This Encounter   Procedures    CBC Auto Differential    CBC & Differential     ED Course:    ED Course as of 09/29/24 0746   Thu Sep 26, 2024   2008 Vitals and Telemetry tracing was reviewed and directly interpreted by myself demonstrating blood pressure 167/118, afebrile, heart rate of 111 respirations of 16 breaths/min and oxygen saturation 95% on room air [JG]   2009 BP(!): 167/118 [JG]   2009 Temp: 98.3 °F (36.8 °C) [JG]   2009 Heart Rate: 111 [JG]   2009 Resp: 16 [JG]   2009 SpO2: 95 % [JG]   2128 I personally reassessed patient in the lobby.  She has had no more evidence of nosebleed.  I have asked for a repeat assessment of vital signs as patient was hypertensive and tachycardic on presentation.  These will be obtained at disposition plan established. [JG]   2129 Patient hemodynamically stable with H&H within normal limits [JG]      ED Course User Index  [JG] Marcin Santillan, PA            DIAGNOSIS  Final diagnoses:   Epistaxis   Elevated blood pressure reading with diagnosis of hypertension     DISPOSITION    ED Disposition       ED Disposition   Discharge    Condition   Stable    Comment   --           DISCHARGE    Patient discharged in stable condition.    Reviewed implications of results, diagnosis, meds, responsibility to follow up, warning signs and symptoms of possible worsening, potential complications and reasons to return to ER.    Patient/Family voiced understanding of above instructions.    Discussed plan for discharge, as there is no emergent indication for admission.  Pt/family is agreeable and understands need for follow up and possible repeat testing.  Pt/family is aware that discharge does not mean that nothing is wrong but that it indicates no emergency is currently present that requires admission and they must continue care with follow-up as given below or with a physician of their choice.     FOLLOW-UP  Kong,  Alice BECKFORD MD  [address]  [facility] [address]  [phone]    Schedule an appointment as soon as possible for a visit   Follow up as directed    [facility] EMERGENCY DEPARTMENT  [address]  [facility] [address] [phone]  Go to   If symptoms worsen         Medication List        New Prescriptions      amLODIPine 2.5 MG tablet  Commonly known as: NORVASC  Take 1 tablet by mouth Daily for 30 days.               Where to Get Your Medications        These medications were sent to [id number] - [place], [place] - [address] [facility] [facility] - [phone]  - [phone]   [address], [facility] [address]      Phone: [phone]   amLODIPine 2.5 MG tablet          Please note that portions of this document were completed with voice recognition software.        Marcin Santillan PA  09/29/24 0744

## 2025-06-10 ENCOUNTER — TRANSCRIBE ORDERS (OUTPATIENT)
Dept: ADMINISTRATIVE | Facility: HOSPITAL | Age: OVER 89
End: 2025-06-10
Payer: MEDICARE

## 2025-06-10 ENCOUNTER — HOSPITAL ENCOUNTER (OUTPATIENT)
Dept: GENERAL RADIOLOGY | Facility: HOSPITAL | Age: OVER 89
Discharge: HOME | End: 2025-06-10
Admitting: FAMILY MEDICINE
Payer: MEDICARE

## 2025-06-10 DIAGNOSIS — J18.1 PNEUMONIA, LOBAR: ICD-10-CM

## 2025-06-10 DIAGNOSIS — J18.1 PNEUMONIA, LOBAR: Primary | ICD-10-CM

## 2025-06-10 PROCEDURE — 71046 X-RAY EXAM CHEST 2 VIEWS: CPT
